# Patient Record
Sex: MALE | Race: OTHER | NOT HISPANIC OR LATINO | URBAN - METROPOLITAN AREA
[De-identification: names, ages, dates, MRNs, and addresses within clinical notes are randomized per-mention and may not be internally consistent; named-entity substitution may affect disease eponyms.]

---

## 2017-02-06 ENCOUNTER — EMERGENCY (EMERGENCY)
Facility: HOSPITAL | Age: 27
LOS: 1 days | Discharge: HOME | End: 2017-02-06
Attending: EMERGENCY MEDICINE | Admitting: EMERGENCY MEDICINE
Payer: MEDICAID

## 2017-02-06 VITALS
HEART RATE: 89 BPM | SYSTOLIC BLOOD PRESSURE: 126 MMHG | TEMPERATURE: 98 F | OXYGEN SATURATION: 100 % | DIASTOLIC BLOOD PRESSURE: 74 MMHG | RESPIRATION RATE: 18 BRPM

## 2017-02-06 VITALS
RESPIRATION RATE: 18 BRPM | OXYGEN SATURATION: 100 % | DIASTOLIC BLOOD PRESSURE: 70 MMHG | TEMPERATURE: 98 F | SYSTOLIC BLOOD PRESSURE: 125 MMHG | WEIGHT: 169.09 LBS | HEART RATE: 107 BPM

## 2017-02-06 LAB
ALBUMIN SERPL ELPH-MCNC: 4.1 G/DL — SIGNIFICANT CHANGE UP (ref 3.4–5)
ALP SERPL-CCNC: 96 U/L — SIGNIFICANT CHANGE UP (ref 40–120)
ALT FLD-CCNC: 44 U/L — HIGH (ref 12–42)
ANION GAP SERPL CALC-SCNC: 9 MMOL/L — SIGNIFICANT CHANGE UP (ref 9–16)
AST SERPL-CCNC: 46 U/L — HIGH (ref 15–37)
BASOPHILS NFR BLD AUTO: 0.5 % — SIGNIFICANT CHANGE UP (ref 0–2)
BILIRUB SERPL-MCNC: 0.4 MG/DL — SIGNIFICANT CHANGE UP (ref 0.2–1.2)
BUN SERPL-MCNC: 25 MG/DL — HIGH (ref 7–23)
CALCIUM SERPL-MCNC: 9.5 MG/DL — SIGNIFICANT CHANGE UP (ref 8.5–10.5)
CHLORIDE SERPL-SCNC: 99 MMOL/L — SIGNIFICANT CHANGE UP (ref 96–108)
CO2 SERPL-SCNC: 30 MMOL/L — SIGNIFICANT CHANGE UP (ref 22–31)
CREAT SERPL-MCNC: 1 MG/DL — SIGNIFICANT CHANGE UP (ref 0.5–1.3)
EOSINOPHIL NFR BLD AUTO: 0.9 % — SIGNIFICANT CHANGE UP (ref 0–6)
GLUCOSE SERPL-MCNC: 96 MG/DL — SIGNIFICANT CHANGE UP (ref 70–99)
HCT VFR BLD CALC: 42 % — SIGNIFICANT CHANGE UP (ref 39–50)
HGB BLD-MCNC: 14.8 G/DL — SIGNIFICANT CHANGE UP (ref 13–17)
IMM GRANULOCYTES NFR BLD AUTO: 0.3 % — SIGNIFICANT CHANGE UP (ref 0–1.5)
LYMPHOCYTES # BLD AUTO: 19.4 % — SIGNIFICANT CHANGE UP (ref 13–44)
MAGNESIUM SERPL-MCNC: 1.9 MG/DL — SIGNIFICANT CHANGE UP (ref 1.6–2.4)
MCHC RBC-ENTMCNC: 31.2 PG — SIGNIFICANT CHANGE UP (ref 27–34)
MCHC RBC-ENTMCNC: 35.2 G/DL — SIGNIFICANT CHANGE UP (ref 32–36)
MCV RBC AUTO: 88.6 FL — SIGNIFICANT CHANGE UP (ref 80–100)
MONOCYTES NFR BLD AUTO: 9.8 % — SIGNIFICANT CHANGE UP (ref 2–14)
NEUTROPHILS NFR BLD AUTO: 69.1 % — SIGNIFICANT CHANGE UP (ref 43–77)
PLATELET # BLD AUTO: 321 K/UL — SIGNIFICANT CHANGE UP (ref 150–400)
POTASSIUM SERPL-MCNC: 3.2 MMOL/L — LOW (ref 3.5–5.3)
POTASSIUM SERPL-SCNC: 3.2 MMOL/L — LOW (ref 3.5–5.3)
PROT SERPL-MCNC: 7.7 G/DL — SIGNIFICANT CHANGE UP (ref 6.4–8.2)
RBC # BLD: 4.74 M/UL — SIGNIFICANT CHANGE UP (ref 4.2–5.8)
RBC # FLD: 12.4 % — SIGNIFICANT CHANGE UP (ref 10.3–16.9)
SODIUM SERPL-SCNC: 138 MMOL/L — SIGNIFICANT CHANGE UP (ref 132–145)
WBC # BLD: 8.5 K/UL — SIGNIFICANT CHANGE UP (ref 3.8–10.5)
WBC # FLD AUTO: 8.5 K/UL — SIGNIFICANT CHANGE UP (ref 3.8–10.5)

## 2017-02-06 PROCEDURE — 99284 EMERGENCY DEPT VISIT MOD MDM: CPT

## 2017-02-06 RX ORDER — SODIUM CHLORIDE 9 MG/ML
2000 INJECTION INTRAMUSCULAR; INTRAVENOUS; SUBCUTANEOUS ONCE
Qty: 0 | Refills: 0 | Status: COMPLETED | OUTPATIENT
Start: 2017-02-06 | End: 2017-02-06

## 2017-02-06 RX ORDER — CIPROFLOXACIN LACTATE 400MG/40ML
400 VIAL (ML) INTRAVENOUS ONCE
Qty: 0 | Refills: 0 | Status: COMPLETED | OUTPATIENT
Start: 2017-02-06 | End: 2017-02-06

## 2017-02-06 RX ORDER — MOXIFLOXACIN HYDROCHLORIDE TABLETS, 400 MG 400 MG/1
1 TABLET, FILM COATED ORAL
Qty: 5 | Refills: 0 | OUTPATIENT
Start: 2017-02-06 | End: 2017-02-09

## 2017-02-06 RX ORDER — ONDANSETRON 8 MG/1
4 TABLET, FILM COATED ORAL ONCE
Qty: 0 | Refills: 0 | Status: COMPLETED | OUTPATIENT
Start: 2017-02-06 | End: 2017-02-06

## 2017-02-06 RX ORDER — POTASSIUM CHLORIDE 20 MEQ
40 PACKET (EA) ORAL ONCE
Qty: 0 | Refills: 0 | Status: COMPLETED | OUTPATIENT
Start: 2017-02-06 | End: 2017-02-06

## 2017-02-06 RX ADMIN — SODIUM CHLORIDE 1000 MILLILITER(S): 9 INJECTION INTRAMUSCULAR; INTRAVENOUS; SUBCUTANEOUS at 13:47

## 2017-02-06 RX ADMIN — ONDANSETRON 4 MILLIGRAM(S): 8 TABLET, FILM COATED ORAL at 13:47

## 2017-02-06 RX ADMIN — Medication 200 MILLIGRAM(S): at 13:47

## 2017-02-06 NOTE — ED PROVIDER NOTE - OBJECTIVE STATEMENT
26 yom pw nvd x 5 days, periumbilical abd pain unchanged in location or intensity, no modifying factors, no sick contact, no prior abd surgeries.  no fever.  feels sx improving.  nonbloody diarrhea and emesis.  no travel.

## 2017-02-06 NOTE — ED PROVIDER NOTE - CARE PLAN
Principal Discharge DX:	Diarrhea  Secondary Diagnosis:	Nausea & vomiting Principal Discharge DX:	Diarrhea  Secondary Diagnosis:	Nausea & vomiting  Secondary Diagnosis:	Hypokalemia

## 2017-02-06 NOTE — ED PROVIDER NOTE - MEDICAL DECISION MAKING DETAILS
5 days of nvd, abd pain but soft nontender abd on exam, no rlq tenderness, low suspicion for acute appendicitis, will give cipro given duration of ongoing sx, and fluids and antiemetics

## 2017-02-06 NOTE — ED PROVIDER NOTE - PMH
Contusion of knee, unspecified laterality, subsequent encounter    Drug abuse    Phlebitis after injection or infusion

## 2017-02-10 DIAGNOSIS — R10.33 PERIUMBILICAL PAIN: ICD-10-CM

## 2017-02-10 DIAGNOSIS — Z79.899 OTHER LONG TERM (CURRENT) DRUG THERAPY: ICD-10-CM

## 2017-02-10 DIAGNOSIS — E87.6 HYPOKALEMIA: ICD-10-CM

## 2017-02-10 DIAGNOSIS — Z79.2 LONG TERM (CURRENT) USE OF ANTIBIOTICS: ICD-10-CM

## 2017-02-10 DIAGNOSIS — F17.200 NICOTINE DEPENDENCE, UNSPECIFIED, UNCOMPLICATED: ICD-10-CM

## 2017-02-11 ENCOUNTER — EMERGENCY (EMERGENCY)
Facility: HOSPITAL | Age: 27
LOS: 1 days | Discharge: HOME | End: 2017-02-11
Attending: EMERGENCY MEDICINE | Admitting: EMERGENCY MEDICINE
Payer: MEDICAID

## 2017-02-11 VITALS
TEMPERATURE: 99 F | HEART RATE: 88 BPM | SYSTOLIC BLOOD PRESSURE: 130 MMHG | RESPIRATION RATE: 16 BRPM | DIASTOLIC BLOOD PRESSURE: 70 MMHG | OXYGEN SATURATION: 100 %

## 2017-02-11 VITALS
RESPIRATION RATE: 17 BRPM | TEMPERATURE: 98 F | HEART RATE: 98 BPM | OXYGEN SATURATION: 97 % | DIASTOLIC BLOOD PRESSURE: 75 MMHG | SYSTOLIC BLOOD PRESSURE: 136 MMHG

## 2017-02-11 LAB
ALBUMIN SERPL ELPH-MCNC: 3.4 G/DL — SIGNIFICANT CHANGE UP (ref 3.4–5)
ALP SERPL-CCNC: 71 U/L — SIGNIFICANT CHANGE UP (ref 40–120)
ALT FLD-CCNC: 28 U/L — SIGNIFICANT CHANGE UP (ref 12–42)
ANION GAP SERPL CALC-SCNC: 8 MMOL/L — LOW (ref 9–16)
AST SERPL-CCNC: 22 U/L — SIGNIFICANT CHANGE UP (ref 15–37)
BILIRUB SERPL-MCNC: 0.2 MG/DL — SIGNIFICANT CHANGE UP (ref 0.2–1.2)
BUN SERPL-MCNC: 13 MG/DL — SIGNIFICANT CHANGE UP (ref 7–23)
CALCIUM SERPL-MCNC: 8.6 MG/DL — SIGNIFICANT CHANGE UP (ref 8.5–10.5)
CHLORIDE SERPL-SCNC: 101 MMOL/L — SIGNIFICANT CHANGE UP (ref 96–108)
CO2 SERPL-SCNC: 30 MMOL/L — SIGNIFICANT CHANGE UP (ref 22–31)
CREAT SERPL-MCNC: 0.96 MG/DL — SIGNIFICANT CHANGE UP (ref 0.5–1.3)
ETHANOL SERPL-MCNC: <3 MG/DL — SIGNIFICANT CHANGE UP
GLUCOSE SERPL-MCNC: 83 MG/DL — SIGNIFICANT CHANGE UP (ref 70–99)
HCT VFR BLD CALC: 35.3 % — LOW (ref 39–50)
HGB BLD-MCNC: 12 G/DL — LOW (ref 13–17)
LIDOCAIN IGE QN: 331 U/L — SIGNIFICANT CHANGE UP (ref 73–393)
LYMPHOCYTES # BLD AUTO: 13 % — SIGNIFICANT CHANGE UP (ref 13–44)
MCHC RBC-ENTMCNC: 30.6 PG — SIGNIFICANT CHANGE UP (ref 27–34)
MCHC RBC-ENTMCNC: 34 G/DL — SIGNIFICANT CHANGE UP (ref 32–36)
MCV RBC AUTO: 90.1 FL — SIGNIFICANT CHANGE UP (ref 80–100)
MONOCYTES NFR BLD AUTO: 10 % — SIGNIFICANT CHANGE UP (ref 2–14)
NEUTROPHILS NFR BLD AUTO: 69 % — SIGNIFICANT CHANGE UP (ref 43–77)
PLATELET # BLD AUTO: 248 K/UL — SIGNIFICANT CHANGE UP (ref 150–400)
POTASSIUM SERPL-MCNC: 3.1 MMOL/L — LOW (ref 3.5–5.3)
POTASSIUM SERPL-SCNC: 3.1 MMOL/L — LOW (ref 3.5–5.3)
PROT SERPL-MCNC: 6.3 G/DL — LOW (ref 6.4–8.2)
RBC # BLD: 3.92 M/UL — LOW (ref 4.2–5.8)
RBC # FLD: 12.6 % — SIGNIFICANT CHANGE UP (ref 10.3–16.9)
SODIUM SERPL-SCNC: 139 MMOL/L — SIGNIFICANT CHANGE UP (ref 132–145)
WBC # BLD: 7.2 K/UL — SIGNIFICANT CHANGE UP (ref 3.8–10.5)
WBC # FLD AUTO: 7.2 K/UL — SIGNIFICANT CHANGE UP (ref 3.8–10.5)

## 2017-02-11 PROCEDURE — 99284 EMERGENCY DEPT VISIT MOD MDM: CPT

## 2017-02-11 RX ORDER — SACCHAROMYCES BOULARDII 250 MG
1 POWDER IN PACKET (EA) ORAL
Qty: 28 | Refills: 0 | OUTPATIENT
Start: 2017-02-11 | End: 2017-02-25

## 2017-02-11 RX ORDER — ACETAMINOPHEN 500 MG
975 TABLET ORAL ONCE
Qty: 0 | Refills: 0 | Status: COMPLETED | OUTPATIENT
Start: 2017-02-11 | End: 2017-02-11

## 2017-02-11 RX ORDER — FAMOTIDINE 10 MG/ML
20 INJECTION INTRAVENOUS ONCE
Qty: 0 | Refills: 0 | Status: COMPLETED | OUTPATIENT
Start: 2017-02-11 | End: 2017-02-11

## 2017-02-11 RX ORDER — POTASSIUM CHLORIDE 20 MEQ
40 PACKET (EA) ORAL ONCE
Qty: 0 | Refills: 0 | Status: COMPLETED | OUTPATIENT
Start: 2017-02-11 | End: 2017-02-11

## 2017-02-11 RX ORDER — SODIUM CHLORIDE 9 MG/ML
1000 INJECTION INTRAMUSCULAR; INTRAVENOUS; SUBCUTANEOUS ONCE
Qty: 0 | Refills: 0 | Status: COMPLETED | OUTPATIENT
Start: 2017-02-11 | End: 2017-02-11

## 2017-02-11 RX ORDER — SODIUM CHLORIDE 9 MG/ML
3 INJECTION INTRAMUSCULAR; INTRAVENOUS; SUBCUTANEOUS ONCE
Qty: 0 | Refills: 0 | Status: COMPLETED | OUTPATIENT
Start: 2017-02-11 | End: 2017-02-11

## 2017-02-11 RX ADMIN — SODIUM CHLORIDE 3 MILLILITER(S): 9 INJECTION INTRAMUSCULAR; INTRAVENOUS; SUBCUTANEOUS at 12:30

## 2017-02-11 RX ADMIN — Medication 975 MILLIGRAM(S): at 12:38

## 2017-02-11 RX ADMIN — Medication 40 MILLIEQUIVALENT(S): at 14:33

## 2017-02-11 RX ADMIN — SODIUM CHLORIDE 1000 MILLILITER(S): 9 INJECTION INTRAMUSCULAR; INTRAVENOUS; SUBCUTANEOUS at 12:38

## 2017-02-11 RX ADMIN — FAMOTIDINE 20 MILLIGRAM(S): 10 INJECTION INTRAVENOUS at 12:38

## 2017-02-11 RX ADMIN — Medication 5 MILLILITER(S): at 12:56

## 2017-02-11 NOTE — ED PROVIDER NOTE - PROGRESS NOTE DETAILS
Sitting up in bed watching TV.  Feels better and wants to be discharged.  Inc in reactive lymphocytes, potassium low again.  Oral replacement ordered.  D/w patient importance of dietary intake of potassium.  Dr. Alvarado had sent over prescriptions to pharmacy after last visit.  Reinforced this to the patient.  Conservative management discussed with the patient in detail.  Close PMD follow up encouraged.  Strict ED return instructions discussed in detail and patient given the opportunity to ask any questions about their discharge diagnosis and instructions

## 2017-02-11 NOTE — ED PROVIDER NOTE - OBJECTIVE STATEMENT
27 yo male returns to the ED with diarrhea.  Constant x 10 days, mild, watery and loose.  Denies fever or vomiting.  Persistent abdominal cramping.  Seen here a few days ago.  Labs drawn and fluids given.  Did not fill his medication (cipro).  Denies abdominal surgery.  Denies toxic habits.  NO PMH or Past psychiatric history.

## 2017-02-15 DIAGNOSIS — R19.7 DIARRHEA, UNSPECIFIED: ICD-10-CM

## 2017-02-15 DIAGNOSIS — Z79.899 OTHER LONG TERM (CURRENT) DRUG THERAPY: ICD-10-CM

## 2017-02-15 DIAGNOSIS — Z79.2 LONG TERM (CURRENT) USE OF ANTIBIOTICS: ICD-10-CM

## 2017-02-15 DIAGNOSIS — R10.9 UNSPECIFIED ABDOMINAL PAIN: ICD-10-CM

## 2017-02-15 DIAGNOSIS — F17.200 NICOTINE DEPENDENCE, UNSPECIFIED, UNCOMPLICATED: ICD-10-CM

## 2017-03-05 ENCOUNTER — EMERGENCY (EMERGENCY)
Facility: HOSPITAL | Age: 27
LOS: 1 days | Discharge: HOME | End: 2017-03-05
Attending: EMERGENCY MEDICINE | Admitting: EMERGENCY MEDICINE
Payer: MEDICAID

## 2017-03-05 VITALS
OXYGEN SATURATION: 98 % | HEART RATE: 80 BPM | SYSTOLIC BLOOD PRESSURE: 132 MMHG | RESPIRATION RATE: 16 BRPM | DIASTOLIC BLOOD PRESSURE: 72 MMHG | TEMPERATURE: 98 F

## 2017-03-05 DIAGNOSIS — I80.9 PHLEBITIS AND THROMBOPHLEBITIS OF UNSPECIFIED SITE: ICD-10-CM

## 2017-03-05 DIAGNOSIS — F17.210 NICOTINE DEPENDENCE, CIGARETTES, UNCOMPLICATED: ICD-10-CM

## 2017-03-05 DIAGNOSIS — L03.113 CELLULITIS OF RIGHT UPPER LIMB: ICD-10-CM

## 2017-03-05 DIAGNOSIS — Z79.2 LONG TERM (CURRENT) USE OF ANTIBIOTICS: ICD-10-CM

## 2017-03-05 DIAGNOSIS — Z79.899 OTHER LONG TERM (CURRENT) DRUG THERAPY: ICD-10-CM

## 2017-03-05 DIAGNOSIS — M79.601 PAIN IN RIGHT ARM: ICD-10-CM

## 2017-03-05 PROCEDURE — 73070 X-RAY EXAM OF ELBOW: CPT | Mod: 26,RT

## 2017-03-05 PROCEDURE — 99283 EMERGENCY DEPT VISIT LOW MDM: CPT

## 2017-03-05 PROCEDURE — 99284 EMERGENCY DEPT VISIT MOD MDM: CPT | Mod: 25

## 2017-03-05 PROCEDURE — 99053 MED SERV 10PM-8AM 24 HR FAC: CPT

## 2017-03-05 RX ORDER — BACITRACIN ZINC 500 UNIT/G
1 OINTMENT IN PACKET (EA) TOPICAL ONCE
Qty: 0 | Refills: 0 | Status: COMPLETED | OUTPATIENT
Start: 2017-03-05 | End: 2017-03-05

## 2017-03-05 RX ORDER — MUPIROCIN 20 MG/G
1 OINTMENT TOPICAL
Qty: 1 | Refills: 0
Start: 2017-03-05 | End: 2017-03-12

## 2017-03-05 RX ADMIN — Medication 1 APPLICATION(S): at 16:21

## 2017-03-05 RX ADMIN — Medication 1 TABLET(S): at 16:21

## 2017-03-05 NOTE — ED PROVIDER NOTE - OBJECTIVE STATEMENT
27 y/o M with h/o IVDA presents with erythematous lesions to b/l arms secondary to IV drug use. +2 open wounds noted to R upper arm with surrounding erythema. No f/c, drainage. Pt incidentally also c/o ear pain.

## 2017-03-05 NOTE — ED PROVIDER NOTE - CARE PLAN
Principal Discharge DX:	Cellulitis of arm, right  Secondary Diagnosis:	Drug abuse  Secondary Diagnosis:	Phlebitis after injection or infusion

## 2017-03-05 NOTE — ED PROVIDER NOTE - NS ED MD SCRIBE ATTENDING SCRIBE SECTIONS
PHYSICAL EXAM/HIV/PAST MEDICAL/SURGICAL/SOCIAL HISTORY/HISTORY OF PRESENT ILLNESS/DISPOSITION/VITAL SIGNS( Pullset)/REVIEW OF SYSTEMS

## 2017-03-06 VITALS
RESPIRATION RATE: 18 BRPM | DIASTOLIC BLOOD PRESSURE: 69 MMHG | HEART RATE: 96 BPM | TEMPERATURE: 98 F | SYSTOLIC BLOOD PRESSURE: 117 MMHG | OXYGEN SATURATION: 98 %

## 2017-03-06 RX ORDER — AZTREONAM 2 G
1 VIAL (EA) INJECTION
Qty: 20 | Refills: 0 | OUTPATIENT
Start: 2017-03-06 | End: 2017-03-16

## 2017-03-06 RX ORDER — MELOXICAM 15 MG/1
1 TABLET ORAL
Qty: 10 | Refills: 0 | OUTPATIENT
Start: 2017-03-06 | End: 2017-03-11

## 2017-03-06 RX ORDER — KETOROLAC TROMETHAMINE 30 MG/ML
60 SYRINGE (ML) INJECTION ONCE
Qty: 0 | Refills: 0 | Status: DISCONTINUED | OUTPATIENT
Start: 2017-03-06 | End: 2017-03-06

## 2017-03-06 RX ADMIN — Medication 60 MILLIGRAM(S): at 02:03

## 2017-03-06 RX ADMIN — Medication 2 TABLET(S): at 01:37

## 2017-03-06 NOTE — ED ADULT NURSE NOTE - CHIEF COMPLAINT QUOTE
Pt. returning to the ED after being treated and released today for cellulitis of the right arm. Pt. reports "I didn't have time to  my prescriptions and my arm hurts".

## 2017-03-06 NOTE — ED PROVIDER NOTE - OBJECTIVE STATEMENT
25 yo male seen here yesterday pm for arm cellulitis returns stating he did not get hos rx filled and requesting 2nd abx dose and c/o right arm pain. denies f/c/syncope or spreading of cellulitis.

## 2017-03-13 ENCOUNTER — EMERGENCY (EMERGENCY)
Facility: HOSPITAL | Age: 27
LOS: 1 days | Discharge: HOME | End: 2017-03-13
Attending: EMERGENCY MEDICINE | Admitting: EMERGENCY MEDICINE
Payer: MEDICAID

## 2017-03-13 VITALS
TEMPERATURE: 98 F | RESPIRATION RATE: 20 BRPM | OXYGEN SATURATION: 98 % | SYSTOLIC BLOOD PRESSURE: 132 MMHG | HEIGHT: 73 IN | HEART RATE: 76 BPM | DIASTOLIC BLOOD PRESSURE: 74 MMHG | WEIGHT: 164.91 LBS

## 2017-03-13 DIAGNOSIS — S41.131A PUNCTURE WOUND WITHOUT FOREIGN BODY OF RIGHT UPPER ARM, INITIAL ENCOUNTER: ICD-10-CM

## 2017-03-13 DIAGNOSIS — Z79.899 OTHER LONG TERM (CURRENT) DRUG THERAPY: ICD-10-CM

## 2017-03-13 DIAGNOSIS — Z79.2 LONG TERM (CURRENT) USE OF ANTIBIOTICS: ICD-10-CM

## 2017-03-13 DIAGNOSIS — M79.601 PAIN IN RIGHT ARM: ICD-10-CM

## 2017-03-13 DIAGNOSIS — Z79.1 LONG TERM (CURRENT) USE OF NON-STEROIDAL ANTI-INFLAMMATORIES (NSAID): ICD-10-CM

## 2017-03-13 DIAGNOSIS — F17.200 NICOTINE DEPENDENCE, UNSPECIFIED, UNCOMPLICATED: ICD-10-CM

## 2017-03-13 DIAGNOSIS — F19.10 OTHER PSYCHOACTIVE SUBSTANCE ABUSE, UNCOMPLICATED: ICD-10-CM

## 2017-03-13 PROCEDURE — 99283 EMERGENCY DEPT VISIT LOW MDM: CPT | Mod: 25

## 2017-03-13 PROCEDURE — 99053 MED SERV 10PM-8AM 24 HR FAC: CPT

## 2017-03-13 RX ORDER — AZTREONAM 2 G
1 VIAL (EA) INJECTION
Qty: 14 | Refills: 0
Start: 2017-03-13 | End: 2017-03-20

## 2017-03-13 RX ADMIN — Medication 1 TABLET(S): at 23:29

## 2017-03-13 NOTE — ED ADULT TRIAGE NOTE - CHIEF COMPLAINT QUOTE
Received patient as walkin with complaints of wound check to right antecubital area. Patient reports he uses heroin and has had this wound for about 2-3 weeks, open wound noted, no bleeding, no swelling, no purulent drainage noted. patient also wants to lay in a stretcher to sleep, demanding food and soda now.

## 2017-03-15 NOTE — ED PROVIDER NOTE - CARE PLAN
Principal Discharge DX:	Puncture wound of arm, right, multiple sites  Secondary Diagnosis:	Drug abuse

## 2017-03-15 NOTE — ED PROVIDER NOTE - OBJECTIVE STATEMENT
Patient with hx of IVDA, otherwise unknown pmhx, arrives with pain to R arm. Notes using the R anticubital region for injection of heroine, now presents with pain and redness. notes the injection site has been worsening for several weeks. arrives with guaze wrapped around arm. denies recent trauma otherwise, falls or numbness, paresthesias. denies cp, sob.

## 2017-03-15 NOTE — ED PROVIDER NOTE - UPPER EXTREMITY EXAM, RIGHT
TENDERNESS/2 1 cm round punture wound injection sites over anticubital region with underlying ttp, and mild erythema. borders of wounds have whitish fibrotic tissue, granulated, and consistent with chronic wound. no fluctuance, induration or drainage. no crepitus or proximal streaking.

## 2017-03-15 NOTE — ED PROVIDER NOTE - MEDICAL DECISION MAKING DETAILS
started on bactrim. patient advised to refrain from using arm for IVDA. not using clean needles, and given risk factor which could

## 2017-03-15 NOTE — ED PROVIDER NOTE - SKIN AREA #1
volar/2 1 cm round punture wound injection sites over anticubital region with underlying ttp, and mild erythema. borders of wounds have whitish fibrotic tissue, granulated, and consistent with chronic wound. no fluctuance, induration or drainage. no crepitus or proximal streaking.

## 2017-03-16 ENCOUNTER — EMERGENCY (EMERGENCY)
Facility: HOSPITAL | Age: 27
LOS: 1 days | Discharge: LEFT BEFORE DISCHARGE | End: 2017-03-16
Admitting: EMERGENCY MEDICINE

## 2017-03-16 VITALS
HEART RATE: 65 BPM | DIASTOLIC BLOOD PRESSURE: 97 MMHG | SYSTOLIC BLOOD PRESSURE: 153 MMHG | WEIGHT: 160.06 LBS | TEMPERATURE: 98 F | HEIGHT: 74 IN | OXYGEN SATURATION: 100 % | RESPIRATION RATE: 18 BRPM

## 2017-03-16 DIAGNOSIS — Z48.01 ENCOUNTER FOR CHANGE OR REMOVAL OF SURGICAL WOUND DRESSING: ICD-10-CM

## 2017-03-20 ENCOUNTER — EMERGENCY (EMERGENCY)
Facility: HOSPITAL | Age: 27
LOS: 1 days | Discharge: HOME | End: 2017-03-20
Attending: EMERGENCY MEDICINE | Admitting: EMERGENCY MEDICINE

## 2017-03-20 VITALS
RESPIRATION RATE: 16 BRPM | DIASTOLIC BLOOD PRESSURE: 82 MMHG | OXYGEN SATURATION: 100 % | TEMPERATURE: 97 F | SYSTOLIC BLOOD PRESSURE: 136 MMHG | HEART RATE: 106 BPM

## 2017-03-20 DIAGNOSIS — S01.91XA LACERATION WITHOUT FOREIGN BODY OF UNSPECIFIED PART OF HEAD, INITIAL ENCOUNTER: ICD-10-CM
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
## 2017-03-20 NOTE — ED PROVIDER NOTE - NS ED MD SCRIBE ATTENDING SCRIBE SECTIONS
VITAL SIGNS( Pullset)/REVIEW OF SYSTEMS/RESULTS/DISPOSITION/PAST MEDICAL/SURGICAL/SOCIAL HISTORY/PHYSICAL EXAM/HIV/HISTORY OF PRESENT ILLNESS/PROGRESS NOTE

## 2017-03-21 ENCOUNTER — EMERGENCY (EMERGENCY)
Facility: HOSPITAL | Age: 27
LOS: 1 days | Discharge: LEFT BEFORE DISCHARGE | End: 2017-03-21
Attending: EMERGENCY MEDICINE | Admitting: EMERGENCY MEDICINE

## 2017-03-21 VITALS
HEART RATE: 98 BPM | OXYGEN SATURATION: 97 % | RESPIRATION RATE: 18 BRPM | TEMPERATURE: 98 F | DIASTOLIC BLOOD PRESSURE: 88 MMHG | SYSTOLIC BLOOD PRESSURE: 145 MMHG

## 2017-03-21 DIAGNOSIS — S31.010A LACERATION WITHOUT FOREIGN BODY OF LOWER BACK AND PELVIS WITHOUT PENETRATION INTO RETROPERITONEUM, INITIAL ENCOUNTER: ICD-10-CM
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
## 2017-03-29 ENCOUNTER — EMERGENCY (EMERGENCY)
Facility: HOSPITAL | Age: 27
LOS: 1 days | Discharge: HOME | End: 2017-03-29
Attending: EMERGENCY MEDICINE | Admitting: EMERGENCY MEDICINE
Payer: MEDICAID

## 2017-03-29 VITALS
TEMPERATURE: 98 F | RESPIRATION RATE: 18 BRPM | SYSTOLIC BLOOD PRESSURE: 124 MMHG | DIASTOLIC BLOOD PRESSURE: 85 MMHG | HEART RATE: 112 BPM | OXYGEN SATURATION: 97 %

## 2017-03-29 VITALS
SYSTOLIC BLOOD PRESSURE: 123 MMHG | RESPIRATION RATE: 19 BRPM | HEART RATE: 118 BPM | OXYGEN SATURATION: 96 % | TEMPERATURE: 99 F | DIASTOLIC BLOOD PRESSURE: 85 MMHG

## 2017-03-29 DIAGNOSIS — Z79.899 OTHER LONG TERM (CURRENT) DRUG THERAPY: ICD-10-CM

## 2017-03-29 DIAGNOSIS — F17.200 NICOTINE DEPENDENCE, UNSPECIFIED, UNCOMPLICATED: ICD-10-CM

## 2017-03-29 DIAGNOSIS — R11.0 NAUSEA: ICD-10-CM

## 2017-03-29 DIAGNOSIS — R45.1 RESTLESSNESS AND AGITATION: ICD-10-CM

## 2017-03-29 DIAGNOSIS — R46.0 VERY LOW LEVEL OF PERSONAL HYGIENE: ICD-10-CM

## 2017-03-29 DIAGNOSIS — R19.7 DIARRHEA, UNSPECIFIED: ICD-10-CM

## 2017-03-29 PROCEDURE — 99283 EMERGENCY DEPT VISIT LOW MDM: CPT
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
## 2017-04-17 ENCOUNTER — EMERGENCY (EMERGENCY)
Facility: HOSPITAL | Age: 27
LOS: 1 days | Discharge: HOME | End: 2017-04-17
Attending: EMERGENCY MEDICINE | Admitting: EMERGENCY MEDICINE

## 2017-04-17 VITALS
DIASTOLIC BLOOD PRESSURE: 68 MMHG | HEART RATE: 87 BPM | TEMPERATURE: 98 F | RESPIRATION RATE: 17 BRPM | OXYGEN SATURATION: 99 % | SYSTOLIC BLOOD PRESSURE: 119 MMHG

## 2017-04-17 DIAGNOSIS — H57.11 OCULAR PAIN, RIGHT EYE: ICD-10-CM

## 2017-06-10 ENCOUNTER — EMERGENCY (EMERGENCY)
Facility: HOSPITAL | Age: 27
LOS: 1 days | Discharge: HOME | End: 2017-06-10
Attending: EMERGENCY MEDICINE | Admitting: EMERGENCY MEDICINE
Payer: MEDICAID

## 2017-06-10 VITALS
OXYGEN SATURATION: 99 % | RESPIRATION RATE: 16 BRPM | HEART RATE: 100 BPM | WEIGHT: 164.91 LBS | SYSTOLIC BLOOD PRESSURE: 118 MMHG | HEIGHT: 73 IN | DIASTOLIC BLOOD PRESSURE: 74 MMHG | TEMPERATURE: 98 F

## 2017-06-10 DIAGNOSIS — F17.200 NICOTINE DEPENDENCE, UNSPECIFIED, UNCOMPLICATED: ICD-10-CM

## 2017-06-10 DIAGNOSIS — Z79.2 LONG TERM (CURRENT) USE OF ANTIBIOTICS: ICD-10-CM

## 2017-06-10 DIAGNOSIS — H61.001 UNSPECIFIED PERICHONDRITIS OF RIGHT EXTERNAL EAR: ICD-10-CM

## 2017-06-10 DIAGNOSIS — Z79.899 OTHER LONG TERM (CURRENT) DRUG THERAPY: ICD-10-CM

## 2017-06-10 DIAGNOSIS — H92.01 OTALGIA, RIGHT EAR: ICD-10-CM

## 2017-06-10 PROCEDURE — 99284 EMERGENCY DEPT VISIT MOD MDM: CPT | Mod: 25

## 2017-06-10 NOTE — ED PROVIDER NOTE - OBJECTIVE STATEMENT
27 yo M with PMHx of substance abuse, presenting c/o R ear pain x 1 month.  Pt reports R ear pain x 1 month with progressive worsening swelling and redness to the external ear and earlobe region. ?remote trauma.  Denies tinnitus, change in hearing/gait/balance, dysequilibrium, HA, dizziness, fever, chills, FB sensation, cough, sore throat, d/c, bleeding, pruritus, N/V, SOB, CP, palpitations, focal weakness, and malaise.

## 2017-06-10 NOTE — ED PROVIDER NOTE - ENMT, MLM
Airway patent, Nasal mucosa clear. Mouth with normal mucosa. Throat has no vesicles, no oropharyngeal exudates and uvula is midline. TM intact b/l with good cone of light, no d/c or bleeding, no mastoid tenderness b/l, +cauliflower ear to the R pinna/tragus with mild surrounding erythema and edema, no fluctuance or d/c currently

## 2017-06-11 RX ORDER — KETOROLAC TROMETHAMINE 30 MG/ML
60 SYRINGE (ML) INJECTION ONCE
Qty: 0 | Refills: 0 | Status: DISCONTINUED | OUTPATIENT
Start: 2017-06-11 | End: 2017-06-11

## 2017-06-11 RX ORDER — IBUPROFEN 200 MG
1 TABLET ORAL
Qty: 15 | Refills: 0 | OUTPATIENT
Start: 2017-06-11 | End: 2017-06-14

## 2017-06-11 RX ORDER — CIPROFLOXACIN LACTATE 400MG/40ML
500 VIAL (ML) INTRAVENOUS ONCE
Qty: 0 | Refills: 0 | Status: COMPLETED | OUTPATIENT
Start: 2017-06-11 | End: 2017-06-11

## 2017-06-11 RX ORDER — MOXIFLOXACIN HYDROCHLORIDE TABLETS, 400 MG 400 MG/1
1 TABLET, FILM COATED ORAL
Qty: 14 | Refills: 0 | OUTPATIENT
Start: 2017-06-11 | End: 2017-06-18

## 2017-06-11 RX ORDER — AZTREONAM 2 G
1 VIAL (EA) INJECTION
Qty: 14 | Refills: 0 | OUTPATIENT
Start: 2017-06-11 | End: 2017-06-18

## 2017-06-11 RX ADMIN — Medication 60 MILLIGRAM(S): at 00:20

## 2017-06-11 RX ADMIN — Medication 1 TABLET(S): at 00:20

## 2017-06-11 RX ADMIN — Medication 500 MILLIGRAM(S): at 00:20

## 2017-07-22 ENCOUNTER — EMERGENCY (EMERGENCY)
Facility: HOSPITAL | Age: 27
LOS: 1 days | Discharge: LEFT BEFORE DISCHARGE | End: 2017-07-22
Attending: EMERGENCY MEDICINE | Admitting: EMERGENCY MEDICINE

## 2017-07-22 VITALS
DIASTOLIC BLOOD PRESSURE: 89 MMHG | RESPIRATION RATE: 18 BRPM | OXYGEN SATURATION: 99 % | TEMPERATURE: 98 F | HEART RATE: 100 BPM | SYSTOLIC BLOOD PRESSURE: 125 MMHG

## 2017-07-22 DIAGNOSIS — Z03.89 ENCOUNTER FOR OBSERVATION FOR OTHER SUSPECTED DISEASES AND CONDITIONS RULED OUT: ICD-10-CM
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
## 2017-08-10 ENCOUNTER — EMERGENCY (EMERGENCY)
Facility: HOSPITAL | Age: 27
LOS: 1 days | Discharge: HOME | End: 2017-08-10
Admitting: EMERGENCY MEDICINE
Payer: MEDICAID

## 2017-08-10 VITALS
SYSTOLIC BLOOD PRESSURE: 133 MMHG | HEART RATE: 111 BPM | TEMPERATURE: 98 F | RESPIRATION RATE: 16 BRPM | DIASTOLIC BLOOD PRESSURE: 85 MMHG | OXYGEN SATURATION: 100 %

## 2017-08-10 DIAGNOSIS — F17.200 NICOTINE DEPENDENCE, UNSPECIFIED, UNCOMPLICATED: ICD-10-CM

## 2017-08-10 DIAGNOSIS — K02.9 DENTAL CARIES, UNSPECIFIED: ICD-10-CM

## 2017-08-10 DIAGNOSIS — H92.03 OTALGIA, BILATERAL: ICD-10-CM

## 2017-08-10 DIAGNOSIS — H66.93 OTITIS MEDIA, UNSPECIFIED, BILATERAL: ICD-10-CM

## 2017-08-10 DIAGNOSIS — H72.91 UNSPECIFIED PERFORATION OF TYMPANIC MEMBRANE, RIGHT EAR: ICD-10-CM

## 2017-08-10 DIAGNOSIS — Z79.2 LONG TERM (CURRENT) USE OF ANTIBIOTICS: ICD-10-CM

## 2017-08-10 DIAGNOSIS — Z79.899 OTHER LONG TERM (CURRENT) DRUG THERAPY: ICD-10-CM

## 2017-08-10 DIAGNOSIS — Z79.1 LONG TERM (CURRENT) USE OF NON-STEROIDAL ANTI-INFLAMMATORIES (NSAID): ICD-10-CM

## 2017-08-10 PROCEDURE — 99283 EMERGENCY DEPT VISIT LOW MDM: CPT

## 2017-08-10 RX ORDER — IBUPROFEN 200 MG
600 TABLET ORAL ONCE
Qty: 0 | Refills: 0 | Status: COMPLETED | OUTPATIENT
Start: 2017-08-10 | End: 2017-08-10

## 2017-08-10 RX ORDER — IBUPROFEN 200 MG
1 TABLET ORAL
Qty: 20 | Refills: 0 | OUTPATIENT
Start: 2017-08-10 | End: 2017-08-15

## 2017-08-10 RX ORDER — CIPROFLOXACIN AND DEXAMETHASONE 3; 1 MG/ML; MG/ML
4 SUSPENSION/ DROPS AURICULAR (OTIC) ONCE
Qty: 0 | Refills: 0 | Status: COMPLETED | OUTPATIENT
Start: 2017-08-10 | End: 2017-08-10

## 2017-08-10 RX ADMIN — CIPROFLOXACIN AND DEXAMETHASONE 4 DROP(S): 3; 1 SUSPENSION/ DROPS AURICULAR (OTIC) at 13:11

## 2017-08-10 RX ADMIN — Medication 1 TABLET(S): at 13:11

## 2017-08-10 RX ADMIN — Medication 600 MILLIGRAM(S): at 13:12

## 2017-08-10 NOTE — ED PROVIDER NOTE - TOOTH FINDINGS
DENTAL CARIES/dental caries on surface of L upper molar where pt localizes pain, no gingival erythema or swelling or drainage.  + tenderness/ sensitivity to tooth

## 2017-08-10 NOTE — ED ADULT TRIAGE NOTE - CHIEF COMPLAINT QUOTE
Pt with complaints of left sided tooth pain and bilateral ear pain. Pt eating nuts out in triage and sts" I'm leaving in a half hour to go home and sleep."

## 2017-08-10 NOTE — ED PROVIDER NOTE - OBJECTIVE STATEMENT
27 y/o M presents to ED c/o bilat ear pain and L upper molar pain x 1 day.  He denies fevers/chills, n/v, recent trauma to ear or face, drainage from ear or tooth.  Of note, pt states he had trauma to R ear in the past with permanent swelling to R external ear.

## 2017-08-10 NOTE — ED PROVIDER NOTE - MEDICAL DECISION MAKING DETAILS
27 y/o M with PMH of drug abuse presents to ED c/o bilat ear pain and L upper toothache.  +AOM bilat with ruptured TM on right.  Will treat with oral abx and ear drops.  Pt advised to f/u with PCP.  Return precautions discussed. 25 y/o M with PMH of drug abuse presents to ED c/o bilat ear pain and L upper toothache.  +AOM bilat with ruptured TM on right.  Will treat with oral abx and ear drops.  Pt advised to f/u with ENT at [facility] and [facility].  Return precautions discussed.

## 2017-08-10 NOTE — ED PROVIDER NOTE - CARE PLAN
Principal Discharge DX:	Acute otitis media  Secondary Diagnosis:	Ruptured tympanic membrane, right  Secondary Diagnosis:	Dental caries of smooth surface

## 2017-08-10 NOTE — ED PROVIDER NOTE - RIGHT EAR
R external ear is swollen and slightly deformed, cauliflower ear.  Pt states he had history of trauma to ear in the remote past./EXTERNAL CANAL INFLAMMATION/TM PERFORATIONS

## 2017-08-29 ENCOUNTER — EMERGENCY (EMERGENCY)
Facility: HOSPITAL | Age: 27
LOS: 1 days | Discharge: HOME | End: 2017-08-29
Admitting: EMERGENCY MEDICINE
Payer: MEDICAID

## 2017-08-29 VITALS
OXYGEN SATURATION: 98 % | TEMPERATURE: 98 F | SYSTOLIC BLOOD PRESSURE: 126 MMHG | RESPIRATION RATE: 20 BRPM | HEART RATE: 105 BPM | DIASTOLIC BLOOD PRESSURE: 75 MMHG

## 2017-08-29 VITALS
HEART RATE: 98 BPM | SYSTOLIC BLOOD PRESSURE: 121 MMHG | RESPIRATION RATE: 18 BRPM | DIASTOLIC BLOOD PRESSURE: 80 MMHG | OXYGEN SATURATION: 98 %

## 2017-08-29 DIAGNOSIS — H92.03 OTALGIA, BILATERAL: ICD-10-CM

## 2017-08-29 DIAGNOSIS — H60.11 CELLULITIS OF RIGHT EXTERNAL EAR: ICD-10-CM

## 2017-08-29 PROCEDURE — 99284 EMERGENCY DEPT VISIT MOD MDM: CPT | Mod: 25

## 2017-08-29 RX ORDER — IBUPROFEN 200 MG
1 TABLET ORAL
Qty: 28 | Refills: 0 | OUTPATIENT
Start: 2017-08-29 | End: 2017-09-05

## 2017-08-29 RX ADMIN — Medication 1 TABLET(S): at 08:37

## 2017-08-29 NOTE — ED PROVIDER NOTE - MEDICAL DECISION MAKING DETAILS
will treat for right ear cellulitis, probable chronic, rx augmentin, nsaids, advised to follow up with ENT

## 2017-08-29 NOTE — ED ADULT NURSE NOTE - OBJECTIVE STATEMENT
Patient is a 17 y/o male presenting to the ED with b/l ear pain/ache, worse on the right x 3 weeks. Patient denies fever/chills, hearing loss, tinnitus. Patient in NAD, resting comfortably, and will continue to monitor.

## 2017-08-29 NOTE — ED PROVIDER NOTE - CONSTITUTIONAL, MLM
normal... disheveled, poor hygiene, awake, alert, oriented to person, place, time/situation and in no apparent distress.

## 2017-08-29 NOTE — ED PROVIDER NOTE - OBJECTIVE STATEMENT
17 yo male hx polysubstance abuse, crystal meth, undomiciled, well known to this ED, here c/o bilateral eye soreness that started today and bilateral ear pain x "a long time" - patient cannot quantify time frame. R ear > Left ear. No fever, no chills. 15 yo male hx polysubstance abuse, crystal meth, undomiciled, well known to this ED, here c/o bilateral eye soreness that started today and bilateral ear pain x "a long time" - patient cannot quantify time frame. R ear > Left ear. No fever, no chills. No headache, no neck pain.

## 2017-08-29 NOTE — ED PROVIDER NOTE - ENMT, MLM
Airway patent, Nasal mucosa clear. Mouth with normal mucosa. Throat has no vesicles, no oropharyngeal exudates and uvula is midline.  R outer ear with mild edema and erythema appears chronic with mild ttp, minimal warmth. no discharge, normal TM with good cone of light. L ear normal exam. Airway patent, Nasal mucosa clear. Mouth with normal mucosa. Throat has no vesicles, no oropharyngeal exudates and uvula is midline.  R outer ear with mild edema and erythema appears chronic with mild ttp, minimal warmth. no discharge, normal TM with good cone of light. no mastoid swelling or tenderness. L ear normal exam, normal outer ear, nml ear canal with good cone of light

## 2017-08-29 NOTE — ED ADULT TRIAGE NOTE - CHIEF COMPLAINT QUOTE
[place] patient with complaints of bilateral earache intermittently for 3 weeks. Patient currently afebrile. No other distress noted.

## 2017-10-27 ENCOUNTER — EMERGENCY (EMERGENCY)
Facility: HOSPITAL | Age: 27
LOS: 1 days | Discharge: HOME | End: 2017-10-27
Attending: EMERGENCY MEDICINE | Admitting: EMERGENCY MEDICINE
Payer: MEDICAID

## 2017-10-27 VITALS
TEMPERATURE: 98 F | HEART RATE: 68 BPM | RESPIRATION RATE: 18 BRPM | DIASTOLIC BLOOD PRESSURE: 85 MMHG | SYSTOLIC BLOOD PRESSURE: 132 MMHG | OXYGEN SATURATION: 99 %

## 2017-10-27 PROCEDURE — 73110 X-RAY EXAM OF WRIST: CPT | Mod: 26,RT

## 2017-10-27 PROCEDURE — 73130 X-RAY EXAM OF HAND: CPT | Mod: 26,RT

## 2017-10-27 PROCEDURE — 99283 EMERGENCY DEPT VISIT LOW MDM: CPT

## 2017-10-27 RX ORDER — TETANUS TOXOID, REDUCED DIPHTHERIA TOXOID AND ACELLULAR PERTUSSIS VACCINE, ADSORBED 5; 2.5; 8; 8; 2.5 [IU]/.5ML; [IU]/.5ML; UG/.5ML; UG/.5ML; UG/.5ML
0.5 SUSPENSION INTRAMUSCULAR ONCE
Qty: 0 | Refills: 0 | Status: COMPLETED | OUTPATIENT
Start: 2017-10-27 | End: 2017-10-27

## 2017-10-27 RX ORDER — CEPHALEXIN 500 MG
500 CAPSULE ORAL ONCE
Qty: 0 | Refills: 0 | Status: COMPLETED | OUTPATIENT
Start: 2017-10-27 | End: 2017-10-27

## 2017-10-27 RX ADMIN — Medication 500 MILLIGRAM(S): at 12:57

## 2017-10-27 RX ADMIN — TETANUS TOXOID, REDUCED DIPHTHERIA TOXOID AND ACELLULAR PERTUSSIS VACCINE, ADSORBED 0.5 MILLILITER(S): 5; 2.5; 8; 8; 2.5 SUSPENSION INTRAMUSCULAR at 13:00

## 2017-10-27 NOTE — ED PROVIDER NOTE - MEDICAL DECISION MAKING DETAILS
plan: xrays, tetanus, will cover w abxs since wound seems dirty and it happened last night. will approximate wounds.
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
## 2017-10-27 NOTE — ED PROVIDER NOTE - SKIN, MLM
abrasion noted to lateral aspect of wrist and V shaped laceration with tissue loss noted in R thumb 4 cm long

## 2017-10-27 NOTE — ED PROVIDER NOTE - PROGRESS NOTE DETAILS
explained finding of 5th digit fracture, pt refused splinting, also pt refused lac local care and repair he just wanted it wrapped. Explained that would increase risk of infection and further complications like a non union of finger fracture. Pt understands and wants to leave the ED. Will prescribe antibiotics to prevent infection. F/U information provided
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
## 2017-10-27 NOTE — ED ADULT NURSE REASSESSMENT NOTE - NS ED NURSE REASSESS COMMENT FT1
Pt. refused sutures by MD, states he does not have time and he wants it to be done later. Pt. educated on the possibility of infection, encouraged to f/u with PMD and to take all antibiotics as prescribed. Verbalized understanding.

## 2017-10-31 DIAGNOSIS — Z79.1 LONG TERM (CURRENT) USE OF NON-STEROIDAL ANTI-INFLAMMATORIES (NSAID): ICD-10-CM

## 2017-10-31 DIAGNOSIS — S61.011A LACERATION WITHOUT FOREIGN BODY OF RIGHT THUMB WITHOUT DAMAGE TO NAIL, INITIAL ENCOUNTER: ICD-10-CM

## 2017-10-31 DIAGNOSIS — S60.811A ABRASION OF RIGHT WRIST, INITIAL ENCOUNTER: ICD-10-CM

## 2017-10-31 DIAGNOSIS — Z79.2 LONG TERM (CURRENT) USE OF ANTIBIOTICS: ICD-10-CM
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
## 2017-10-31 DIAGNOSIS — M79.641 PAIN IN RIGHT HAND: ICD-10-CM

## 2017-10-31 DIAGNOSIS — Z23 ENCOUNTER FOR IMMUNIZATION: ICD-10-CM

## 2017-10-31 DIAGNOSIS — S60.221A CONTUSION OF RIGHT HAND, INITIAL ENCOUNTER: ICD-10-CM

## 2017-10-31 DIAGNOSIS — Z79.899 OTHER LONG TERM (CURRENT) DRUG THERAPY: ICD-10-CM

## 2017-11-10 ENCOUNTER — EMERGENCY (EMERGENCY)
Facility: HOSPITAL | Age: 27
LOS: 1 days | Discharge: HOME | End: 2017-11-10
Attending: EMERGENCY MEDICINE | Admitting: EMERGENCY MEDICINE
Payer: MEDICAID

## 2017-11-10 VITALS
RESPIRATION RATE: 18 BRPM | TEMPERATURE: 98 F | HEART RATE: 99 BPM | SYSTOLIC BLOOD PRESSURE: 125 MMHG | DIASTOLIC BLOOD PRESSURE: 85 MMHG | OXYGEN SATURATION: 100 %

## 2017-11-10 PROCEDURE — 99283 EMERGENCY DEPT VISIT LOW MDM: CPT

## 2017-11-10 RX ADMIN — Medication 1 TABLET(S): at 13:16

## 2017-11-10 NOTE — ED PROVIDER NOTE - PROGRESS NOTE DETAILS
I explained to patient that he has an infection in his thumb which appears to be spreading to other parts of his hand and index finger.  I explained the need for aggressive wound care and IV antibiotics.  I explained the need for oral antibiotics to be taken consistently and the need for very close followup with hand surgeon.  Patient voiced understanding but stated that he has "things to do and will come back later".  Patient was given oral Augmentin, new prescription was sent across to  which patient states he will be able to  this time as now he has ID, etc.  Risks/benefits/alternatives discussed. Patient aggressively refuses labs, IV, IV abx, or wound care beyond dressing (which was performed with xeroform, sterile 4x4 and, kerlex) and states he will come back tomorrow.  He then eloped.
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
## 2017-11-10 NOTE — ED PROVIDER NOTE - MUSCULOSKELETAL, MLM
Spine appears normal, range of motion is not limited, no muscle or joint tenderness, with exception of right hand. See skin note for details.
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
## 2017-11-10 NOTE — ED PROVIDER NOTE - SKIN, MLM
Dominant right thumb has poorly healing laceration in area of metacarpal phalangeal joint on dorsal surface of hand, with surrounding redness and tenderness consistent with cellulitis. Mild swelling and redness to right index finger. Normal cap refill and sensation intact. Dominant right thumb has poorly healing laceration in area of metacarpal phalangeal joint on dorsal surface of hand, with surrounding redness and tenderness consistent with cellulitis. Mild swelling and redness to right index finger. Normal cap refill and sensation intact..  No gabriela pus or abscess present.  No tenderness along any tendons.  ROM all joints intact.

## 2017-11-13 NOTE — ED POST DISCHARGE NOTE - OTHER COMMUNICATION
Could not reach the patient to discuss the reasons as to why he left the ER and did not want to stay for further treatment.

## 2017-11-14 DIAGNOSIS — L03.011 CELLULITIS OF RIGHT FINGER: ICD-10-CM

## 2017-11-14 DIAGNOSIS — Z79.899 OTHER LONG TERM (CURRENT) DRUG THERAPY: ICD-10-CM

## 2017-11-14 DIAGNOSIS — Z79.1 LONG TERM (CURRENT) USE OF NON-STEROIDAL ANTI-INFLAMMATORIES (NSAID): ICD-10-CM

## 2017-11-14 DIAGNOSIS — Z79.2 LONG TERM (CURRENT) USE OF ANTIBIOTICS: ICD-10-CM

## 2017-11-14 DIAGNOSIS — M79.644 PAIN IN RIGHT FINGER(S): ICD-10-CM

## 2018-01-19 ENCOUNTER — EMERGENCY (EMERGENCY)
Facility: HOSPITAL | Age: 28
LOS: 1 days | Discharge: HOME | End: 2018-01-19
Attending: EMERGENCY MEDICINE | Admitting: EMERGENCY MEDICINE
Payer: MEDICAID

## 2018-01-19 VITALS
SYSTOLIC BLOOD PRESSURE: 128 MMHG | DIASTOLIC BLOOD PRESSURE: 87 MMHG | TEMPERATURE: 98 F | OXYGEN SATURATION: 99 % | HEART RATE: 83 BPM | RESPIRATION RATE: 16 BRPM

## 2018-01-19 DIAGNOSIS — Z79.1 LONG TERM (CURRENT) USE OF NON-STEROIDAL ANTI-INFLAMMATORIES (NSAID): ICD-10-CM

## 2018-01-19 DIAGNOSIS — Z79.899 OTHER LONG TERM (CURRENT) DRUG THERAPY: ICD-10-CM

## 2018-01-19 DIAGNOSIS — R05 COUGH: ICD-10-CM

## 2018-01-19 DIAGNOSIS — Z79.2 LONG TERM (CURRENT) USE OF ANTIBIOTICS: ICD-10-CM

## 2018-01-19 DIAGNOSIS — R09.81 NASAL CONGESTION: ICD-10-CM

## 2018-01-19 DIAGNOSIS — R21 RASH AND OTHER NONSPECIFIC SKIN ERUPTION: ICD-10-CM

## 2018-01-19 DIAGNOSIS — H92.09 OTALGIA, UNSPECIFIED EAR: ICD-10-CM

## 2018-01-19 DIAGNOSIS — F17.200 NICOTINE DEPENDENCE, UNSPECIFIED, UNCOMPLICATED: ICD-10-CM

## 2018-01-19 PROCEDURE — 99283 EMERGENCY DEPT VISIT LOW MDM: CPT

## 2018-01-19 RX ORDER — DIPHENHYDRAMINE HCL 50 MG
25 CAPSULE ORAL ONCE
Qty: 0 | Refills: 0 | Status: DISCONTINUED | OUTPATIENT
Start: 2018-01-19 | End: 2018-01-23

## 2018-01-19 NOTE — ED PROVIDER NOTE - MEDICAL DECISION MAKING DETAILS
MSE performed.  benadryl for itching.  TMs clear, lungs clear, no rash.  Conservative management discussed with the patient in detail.  Close PMD follow up encouraged.  Strict ED return instructions discussed in detail and patient given the opportunity to ask any questions about their discharge diagnosis and instructions

## 2018-01-19 NOTE — ED PROVIDER NOTE - OBJECTIVE STATEMENT
Frequent ED visits for a myriad of complaints.  here for cough, chest rash, nasal congestion, ear pain.  gradual, constant, mild.  Does not have a PMD.  Current smoker.

## 2018-06-27 ENCOUNTER — EMERGENCY (EMERGENCY)
Facility: HOSPITAL | Age: 28
LOS: 1 days | Discharge: HOME | End: 2018-06-27
Admitting: EMERGENCY MEDICINE

## 2018-06-27 VITALS
DIASTOLIC BLOOD PRESSURE: 90 MMHG | HEART RATE: 95 BPM | OXYGEN SATURATION: 96 % | RESPIRATION RATE: 18 BRPM | SYSTOLIC BLOOD PRESSURE: 136 MMHG | TEMPERATURE: 98 F

## 2018-06-27 DIAGNOSIS — Z11.4 ENCOUNTER FOR SCREENING FOR HUMAN IMMUNODEFICIENCY VIRUS [HIV]: ICD-10-CM

## 2018-06-28 NOTE — ED POST DISCHARGE NOTE - DETAILS
The patients phone number is no longer in service The patients phone number is no longer in service and he could not be reached for a follow up

## 2018-11-30 NOTE — ED ADULT NURSE NOTE - AGGRAVATING FACTORS
Melolabial Interpolation Flap Text: A decision was made to reconstruct the defect utilizing an interpolation axial flap and a staged reconstruction.  A telfa template was made of the defect.  This telfa template was then used to outline the melolabial interpolation flap.  The donor area for the pedicle flap was then injected with anesthesia.  The flap was excised through the skin and subcutaneous tissue down to the layer of the underlying musculature.  The pedicle flap was carefully excised within this deep plane to maintain its blood supply.  The edges of the donor site were undermined.   The donor site was closed in a primary fashion.  The pedicle was then rotated into position and sutured.  Once the tube was sutured into place, adequate blood supply was confirmed with blanching and refill.  The pedicle was then wrapped with xeroform gauze and dressed appropriately with a telfa and gauze bandage to ensure continued blood supply and protect the attached pedicle. none

## 2019-08-19 NOTE — ED PROVIDER NOTE - NS_EDPROVIDERDISPOUSERTYPE_ED_A_ED
stated I have personally evaluated and examined the patient. The Attending was available to me as a supervising provider if needed.

## 2019-08-24 ENCOUNTER — EMERGENCY (EMERGENCY)
Facility: HOSPITAL | Age: 29
LOS: 1 days | Discharge: HOME | End: 2019-08-24
Admitting: EMERGENCY MEDICINE
Payer: SELF-PAY

## 2019-08-24 VITALS
HEART RATE: 92 BPM | TEMPERATURE: 98 F | OXYGEN SATURATION: 98 % | RESPIRATION RATE: 18 BRPM | SYSTOLIC BLOOD PRESSURE: 149 MMHG | WEIGHT: 160.06 LBS | DIASTOLIC BLOOD PRESSURE: 82 MMHG

## 2019-08-24 PROCEDURE — 99283 EMERGENCY DEPT VISIT LOW MDM: CPT

## 2019-08-24 RX ORDER — MUPIROCIN 20 MG/G
1 OINTMENT TOPICAL
Qty: 1 | Refills: 0
Start: 2019-08-24 | End: 2019-08-30

## 2019-08-24 RX ORDER — AZTREONAM 2 G
1 VIAL (EA) INJECTION
Qty: 14 | Refills: 0
Start: 2019-08-24 | End: 2019-08-30

## 2019-08-24 RX ORDER — BACITRACIN ZINC 500 UNIT/G
1 OINTMENT IN PACKET (EA) TOPICAL ONCE
Refills: 0 | Status: COMPLETED | OUTPATIENT
Start: 2019-08-24 | End: 2019-08-24

## 2019-08-24 RX ORDER — MUPIROCIN 20 MG/G
1 OINTMENT TOPICAL
Qty: 5 | Refills: 0
Start: 2019-08-24 | End: 2019-09-02

## 2019-08-24 RX ADMIN — Medication 2 TABLET(S): at 12:51

## 2019-08-24 RX ADMIN — Medication 1 TABLET(S): at 12:50

## 2019-08-24 RX ADMIN — Medication 1 APPLICATION(S): at 12:51

## 2019-08-24 NOTE — ED PROVIDER NOTE - PHYSICAL EXAMINATION
multiple tract marks over BL upper extremities, dry, with open eschars with underlying erythema and serous draining.  2x2cm area of erythema, induration and fluctuance over volar aspect of R forearm

## 2019-08-24 NOTE — ED ADULT NURSE NOTE - CHIEF COMPLAINT QUOTE
pt has multiple wounds to arms that appear as puncture marks that are scabs. Pt states they are more swollen and oozing then usual.
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
## 2019-08-24 NOTE — ED PROVIDER NOTE - OBJECTIVE STATEMENT
PMHX IVD use last injected 2 weeks ago, homeless presents with increased pain over L arm from wounds.   denies fever, chills, nightsweats. patient is requesting ice for wounds

## 2019-08-28 DIAGNOSIS — L03.114 CELLULITIS OF LEFT UPPER LIMB: ICD-10-CM

## 2019-08-28 DIAGNOSIS — L03.113 CELLULITIS OF RIGHT UPPER LIMB: ICD-10-CM

## 2019-08-28 DIAGNOSIS — L02.414 CUTANEOUS ABSCESS OF LEFT UPPER LIMB: ICD-10-CM

## 2019-08-28 DIAGNOSIS — M79.602 PAIN IN LEFT ARM: ICD-10-CM

## 2019-08-29 NOTE — ED ADULT TRIAGE NOTE - CHIEF COMPLAINT QUOTE
I have placed the below orders and discussed them with an approved delegating provider.  The MA is performing the below orders under the direction of Criss Black MD.    1. Need for vaccination  Vaccine Information statements given for each vaccine if administered. Discussed benefits and side effects of each vaccine given with patient /family, answered all patient /family questions     - DTAP/IPV Combined Vaccine IM (AGE 4-6Y)  - MMR and Varicella Combined Vaccine SQ   pt has multiple wounds to arms that appear as puncture marks that are scabs. Pt states they are more swollen and oozing then usual.

## 2019-09-10 NOTE — ED ADULT TRIAGE NOTE - ACCOMPANIED BY
Self O-T Plasty Text: The defect edges were debeveled with a #15 scalpel blade.  Given the location of the defect, shape of the defect and the proximity to free margins an O-T plasty was deemed most appropriate.  Using a sterile surgical marker, an appropriate O-T plasty was drawn incorporating the defect and placing the expected incisions within the relaxed skin tension lines where possible.    The area thus outlined was incised deep to adipose tissue with a #15 scalpel blade.  The skin margins were undermined to an appropriate distance in all directions utilizing iris scissors.

## 2019-10-01 NOTE — ED PROVIDER NOTE - CHPI ED SYMPTOMS POS
· Likely in setting of acute renal failure  · On oral bicarb as an outpatient  · Nephrology following, appreciate input   · IV bicarb drip at 70 mL/hr   · BMP in AM abrasion to face

## 2019-10-29 ENCOUNTER — EMERGENCY (EMERGENCY)
Facility: HOSPITAL | Age: 29
LOS: 1 days | Discharge: HOME | End: 2019-10-29
Attending: EMERGENCY MEDICINE | Admitting: EMERGENCY MEDICINE
Payer: SELF-PAY

## 2019-10-29 VITALS
OXYGEN SATURATION: 96 % | RESPIRATION RATE: 16 BRPM | HEIGHT: 73 IN | HEART RATE: 109 BPM | SYSTOLIC BLOOD PRESSURE: 93 MMHG | TEMPERATURE: 98 F | WEIGHT: 169.09 LBS | DIASTOLIC BLOOD PRESSURE: 49 MMHG

## 2019-10-29 DIAGNOSIS — L02.11 CUTANEOUS ABSCESS OF NECK: ICD-10-CM

## 2019-10-29 PROCEDURE — 99283 EMERGENCY DEPT VISIT LOW MDM: CPT

## 2019-10-29 RX ORDER — SODIUM CHLORIDE 9 MG/ML
1000 INJECTION INTRAMUSCULAR; INTRAVENOUS; SUBCUTANEOUS ONCE
Refills: 0 | Status: DISCONTINUED | OUTPATIENT
Start: 2019-10-29 | End: 2019-11-04

## 2019-10-29 RX ORDER — VANCOMYCIN HCL 1 G
1000 VIAL (EA) INTRAVENOUS ONCE
Refills: 0 | Status: DISCONTINUED | OUTPATIENT
Start: 2019-10-29 | End: 2019-11-04

## 2019-10-29 RX ORDER — SODIUM CHLORIDE 9 MG/ML
1500 INJECTION INTRAMUSCULAR; INTRAVENOUS; SUBCUTANEOUS ONCE
Refills: 0 | Status: DISCONTINUED | OUTPATIENT
Start: 2019-10-29 | End: 2019-11-04

## 2019-10-29 RX ORDER — ACETAMINOPHEN 500 MG
650 TABLET ORAL ONCE
Refills: 0 | Status: COMPLETED | OUTPATIENT
Start: 2019-10-29 | End: 2019-10-29

## 2019-10-29 RX ADMIN — Medication 650 MILLIGRAM(S): at 21:19

## 2019-10-29 NOTE — ED PROVIDER NOTE - ENMT, MLM
Airway patent, Nasal mucosa clear. Mouth with normal mucosa. Throat has no vesicles, no oropharyngeal exudates and uvula is midline. Circular region of swelling noted to the anterior cricoid area of the neck with erythema, warmth, and fluctuance, no drainage. Patient able to swallow, speaking in full sentences.

## 2019-10-29 NOTE — ED PROVIDER NOTE - PROGRESS NOTE DETAILS
Patient offered needle inspiration I&D, IV, and abx. Patient is refusing all imaging, IV, and procedures. States he wants something topical. I explained the risk of refusing care, procedures, IV, and     admission, including airway compromise, temporary or permanent disability, or death. Patient has insight and capacity to refuse, and is refusing all care, patient accepts these risks.  Pt wants to be discharged despite being advised STRONGLY and REPEATEDLY to stay for labs and further work up.  A&Ox3, speaking clearly and coherently, demonstrates decision-making capacity. Patient is amenable to EMLA and PO abx for now, with possible needle aspiration. However, patient is non-compliant with medical care. Patient is not amenable for admission to [facility]. Will     reassess shortly after EMLA and heat pack. Patient is not currently amenable to further medical care. pt refused to sign ama paperwork.  pt eloped

## 2019-10-29 NOTE — ED PROVIDER NOTE - SKIN, MLM
Bilateral upper extremities covered with open ulcerated lesions of previous injections with scarring, no other signs of acute erythema or cellulitis.

## 2019-10-29 NOTE — ED PROVIDER NOTE - CHPI ED SYMPTOMS NEG
no nausea, vomiting, difficulty swallowing, difficulty breathing, CP, palpitations/no fever/no chills

## 2019-10-29 NOTE — ED PROVIDER NOTE - ATTENDING CONTRIBUTION TO CARE
patient seen at bedside, coherent, alert and oriented x 3, with palpable fluctuant abscess to midline anterior neck, refusing to do needle I&D, or IV abx, or ct soft tissue neck. explained risks of worsening abscess, and infection which could lead to complications/ temp/permanent disability sepsis or death. verbalized understanding, has capacity and insight. placed emla on abscess, for possible I&D, and ordered po abx. patient subsequently eloped.

## 2019-10-29 NOTE — ED PROVIDER NOTE - CLINICAL SUMMARY MEDICAL DECISION MAKING FREE TEXT BOX
Patient with hx of IVDA presents with swelling to the cricoid region of the neck, concern for abscess/bacteremia. Modified work-up initiated. Patient is afebrile at this time. Ordered IV Vancomycin, weight-based fluids, and Tylenol. No airway compromise at this time. Plan to obtain CT with IV contrast of the neck, consult ENT versus plastics, and likely also medicine admission, anticipate I&D.

## 2019-10-29 NOTE — ED PROVIDER NOTE - OBJECTIVE STATEMENT
30 y/o male with PMHx of IVDA presents to ED c/o anterior neck swelling x 3-4 days. Patient admits to injecting IV methamphetamines. However, denies any injections into his neck. Patient denies any fever, chills, nausea, vomiting, difficulty swallowing, or difficulty breathing. Patient denies any hx of thyroid dysfunction or thyroid storm. Also denies any CP, palpitations, or SOB.

## 2020-05-21 ENCOUNTER — EMERGENCY (EMERGENCY)
Facility: HOSPITAL | Age: 30
LOS: 1 days | Discharge: HOME | End: 2020-05-21
Attending: EMERGENCY MEDICINE | Admitting: EMERGENCY MEDICINE
Payer: MEDICAID

## 2020-05-21 VITALS
TEMPERATURE: 98 F | WEIGHT: 190.04 LBS | DIASTOLIC BLOOD PRESSURE: 78 MMHG | HEIGHT: 70 IN | SYSTOLIC BLOOD PRESSURE: 135 MMHG | HEART RATE: 84 BPM | RESPIRATION RATE: 18 BRPM | OXYGEN SATURATION: 98 %

## 2020-05-21 PROCEDURE — 99284 EMERGENCY DEPT VISIT MOD MDM: CPT

## 2020-05-21 RX ORDER — MUPIROCIN 20 MG/G
1 OINTMENT TOPICAL
Qty: 66 | Refills: 1
Start: 2020-05-21 | End: 2020-06-17

## 2020-05-21 RX ORDER — CHLORHEXIDINE GLUCONATE 213 G/1000ML
1 SOLUTION TOPICAL
Qty: 1 | Refills: 0
Start: 2020-05-21 | End: 2020-06-03

## 2020-05-21 RX ORDER — AZTREONAM 2 G
2 VIAL (EA) INJECTION
Qty: 40 | Refills: 0
Start: 2020-05-21 | End: 2020-05-30

## 2020-05-21 RX ORDER — MUPIROCIN 20 MG/G
1 OINTMENT TOPICAL ONCE
Refills: 0 | Status: COMPLETED | OUTPATIENT
Start: 2020-05-21 | End: 2020-05-21

## 2020-05-21 RX ADMIN — MUPIROCIN 1 APPLICATION(S): 20 OINTMENT TOPICAL at 13:48

## 2020-05-21 NOTE — ED PROVIDER NOTE - SKIN, MLM
+Multiple crater like skin lesions that do not look acutely infected (chronically slow healing) to bilateral forearms. Area of folliculitis 3x3 cm to anterior neck at hairline.

## 2020-05-21 NOTE — ED ADULT NURSE NOTE - OBJECTIVE STATEMENT
Pt has multiple wounds to bilateral arms from iv drug use.  Pt requesting that bandage applied.  Pt bilateral arms wrapped with gauze.  Pt reports wound to anterior neck.  Yellow drainage noted.  Culture obtained from wound.  Bandage applied.

## 2020-05-21 NOTE — ED PROVIDER NOTE - OBJECTIVE STATEMENT
29 y o male presents to ED with various slow healing skin wounds secondary to long time IV drug abuse. He reports that sometimes when he is unconscious after using drugs, "people gauge my skin". Pt is worried he might have a staph infection and notes having a weeping area in his neckline, where he shaves, that has not healed for the past 2 months. Pt has hx of previous ED visits from skin abscesses but no culture data. He denies hx of staph infections but has been prescribed Bactrim and Doxycycline many times in past ED visits. Pt is presenting with low medical healthcare knowledge. No fever, chills, or COVID like sx endorsed. No other medical complaints.

## 2020-05-21 NOTE — ED PROVIDER NOTE - NSFOLLOWUPINSTRUCTIONS_ED_ALL_ED_FT
MRSA Infection, Self-Care, Adult  Methicillin-resistant Staphylococcus aureus (MRSA) infection is caused by bacteria that no longer respond to antibiotic medicines. MRSA infection can be hard to treat.  Self-care is important after being diagnosed with MRSA. Following instructions for self-care will:  Help you heal well.Prevent the infection from spreading to others.Your health care provider may also give you more specific instructions. If you have problems or questions, contact your health care provider.  What are the risks?  MRSA can be on the skin or in the nose of many people without causing problems. This is called MRSA colonization. However, MRSA can cause problems for you and others if it enters the body through a cut, a sore, or an invasive medical procedure or device.  If MRSA enters your body, it may cause serious problems, such as:  Skin infections.Bone or joint infections.Pneumonia.Bloodstream infections (sepsis).If you have these infections, MRSA may spread to others, including:  Visitors or other patients in the hospital.Friends, family, or other people at home or in your community.Supplies needed:  Soap and water.Germ-free (sterile) dressing.Alcohol-based hand  (optional).Home cleaning solutions that contain bleach.Clean or disposable towels.How to prevent MRSA infections  Take these steps to avoid getting another MRSA infection and to prevent the bacteria from spreading to others.  Hand washing        Wash your hands frequently with soap and water. If soap and water are not available, use an alcohol-based hand . Dry your hands with a clean or disposable towel.Make sure that everyone in the household washes their hands often.Wound care     If you have a wound, follow instructions from your health care provider about how to take care of your wound. Make sure you:  Wash your hands with soap and water before and after you change your bandage (dressing). If soap and water are not available, use an alcohol-based hand .Change your dressing as told by your health care provider.Leave any stitches (sutures), skin glue, or adhesive strips in place. These skin closures may need to be in place for 2 weeks or longer. If adhesive strip edges start to loosen and curl up, you may trim the loose edges. Do not remove adhesive strips completely unless your health care provider tells you to do that.Clean wounds, cuts, and abrasions with soap and water and cover them with dry, sterile dressings until they heal.Check your wound every day for signs of infection. Check for:  More redness, swelling, or pain.More fluid or blood.Warmth.Pus or a bad smell.If you have a wound that seems to be infected, ask your health care provider if a culture should be done for MRSA and other bacteria.Personal hygiene    Maintain good hygiene by bathing often and keeping your body clean.Wash hands before preparing food.Always shower after exercising.Wash towels, bedding, and clothes in the washing machine with detergent and hot water. Dry them in a hot dryer.General tips    Take your antibiotic medicine as told by your health care provider. Take them only when absolutely necessary. Do not stop taking the antibiotic even if you start to feel better.Avoid close contact with others as much as possible.Do not use towels, razors, toothbrushes, bedding, or other items that will be used by others.Clean surfaces regularly to remove germs (disinfection). Use products or solutions that contain bleach. Make sure you disinfect bathroom surfaces, food preparation areas, and doorknobs.Follow these instructions at home:  Take over-the-counter and prescription medicines only as told by your health care provider.Tell all health care providers who care for you that you have MRSA or that you have had MRSA.If you are breastfeeding, talk to your health care provider about MRSA. You may be asked to temporarily stop breastfeeding.If you have an invasive medical device, make sure that you know how to take care of it to prevent infection.Keep all follow-up visits as told by your health care provider. This is important.Contact a health care provider if:  Your infection seems to be getting worse. Signs may include:  Warmth, redness, or tenderness around your wound site.A red line that spreads from your infection site.A dark color in the area around your infection.Wound drainage that is tan, yellow, or green.A bad smell coming from your wound.You have symptoms of a new MRSA infection:  A pus-filled pimple.A boil on your skin.Pus draining from your skin.A sore (abscess) under your skin or somewhere in your body.Fever with or without chills.Get help right away if:  You have trouble breathing.You feel nauseous, you vomit, or you cannot take medicine without vomiting.You have chest pain.These symptoms may represent a serious problem that is an emergency. Do not wait to see if the symptoms will go away. Get medical help right away. Call your local emergency services (911 in the [place] Do not drive yourself to the hospital.   Summary  Self-care is important after being diagnosed with MRSA. This will help you heal well and prevent the infection from spreading to others.Wash your hands often, avoid close contact with others, and avoid sharing towels, razors, toothbrushes, and bedding with other people. This will help prevent the infection from spreading to others.If you are being treated for a MRSA infection, make sure to take over-the-counter and prescription medicines as told. Finish all antibiotic medicine even if you start to feel better.If you have a wound, follow instructions from your health care provider about how to take care of it. Check your wound every day for signs of infection.This information is not intended to replace advice given to you by your health care provider. Make sure you discuss any questions you have with your health care provider.

## 2020-05-21 NOTE — ED PROVIDER NOTE - PATIENT PORTAL LINK FT
You can access the FollowMyHealth Patient Portal offered by [facility] by registering at the following website: [email or web address]. By joining [facility]’s FollowMyHealth portal, you will also be able to view your health information using other applications (apps) compatible with our system.

## 2020-05-21 NOTE — ED PROVIDER NOTE - RESPIRATORY NEGATIVE STATEMENT, MLM
no cough and no shortness of breath.
Patient is a 71 year old male with swelling of bilateral legs and erythematous rash. Plan for IV antibiotics, dermatology evaluation.

## 2020-05-22 LAB — METHOD TYPE: SIGNIFICANT CHANGE UP

## 2020-05-23 LAB
-  AMPICILLIN/SULBACTAM: SIGNIFICANT CHANGE UP
-  CEFAZOLIN: SIGNIFICANT CHANGE UP
-  CLINDAMYCIN: SIGNIFICANT CHANGE UP
-  ERYTHROMYCIN: SIGNIFICANT CHANGE UP
-  GENTAMICIN: SIGNIFICANT CHANGE UP
-  OXACILLIN: SIGNIFICANT CHANGE UP
-  PENICILLIN: SIGNIFICANT CHANGE UP
-  RIFAMPIN: SIGNIFICANT CHANGE UP
-  TETRACYCLINE: SIGNIFICANT CHANGE UP
-  TRIMETHOPRIM/SULFAMETHOXAZOLE: SIGNIFICANT CHANGE UP
-  VANCOMYCIN: SIGNIFICANT CHANGE UP
METHOD TYPE: SIGNIFICANT CHANGE UP

## 2020-05-23 NOTE — ED POST DISCHARGE NOTE - DETAILS
contacted number and it is incorrect. will send certifed letter, patient currently on bactrim and culture resistant. certified letter to be sent.

## 2020-05-24 LAB
-  AMPICILLIN/SULBACTAM: SIGNIFICANT CHANGE UP
-  CEFAZOLIN: SIGNIFICANT CHANGE UP
-  CLINDAMYCIN: SIGNIFICANT CHANGE UP
-  DAPTOMYCIN: SIGNIFICANT CHANGE UP
-  ERYTHROMYCIN: SIGNIFICANT CHANGE UP
-  GENTAMICIN: SIGNIFICANT CHANGE UP
-  LINEZOLID: SIGNIFICANT CHANGE UP
-  OXACILLIN: SIGNIFICANT CHANGE UP
-  PENICILLIN: SIGNIFICANT CHANGE UP
-  RIFAMPIN: SIGNIFICANT CHANGE UP
-  TETRACYCLINE: SIGNIFICANT CHANGE UP
-  TRIMETHOPRIM/SULFAMETHOXAZOLE: SIGNIFICANT CHANGE UP
-  VANCOMYCIN: SIGNIFICANT CHANGE UP
METHOD TYPE: SIGNIFICANT CHANGE UP

## 2020-05-25 DIAGNOSIS — L08.9 LOCAL INFECTION OF THE SKIN AND SUBCUTANEOUS TISSUE, UNSPECIFIED: ICD-10-CM

## 2020-05-25 DIAGNOSIS — Z79.899 OTHER LONG TERM (CURRENT) DRUG THERAPY: ICD-10-CM

## 2020-05-25 DIAGNOSIS — Z79.1 LONG TERM (CURRENT) USE OF NON-STEROIDAL ANTI-INFLAMMATORIES (NSAID): ICD-10-CM

## 2020-05-25 DIAGNOSIS — Z79.2 LONG TERM (CURRENT) USE OF ANTIBIOTICS: ICD-10-CM

## 2020-05-25 DIAGNOSIS — Z48.00 ENCOUNTER FOR CHANGE OR REMOVAL OF NONSURGICAL WOUND DRESSING: ICD-10-CM

## 2020-05-26 LAB
CULTURE RESULTS: SIGNIFICANT CHANGE UP
ORGANISM # SPEC MICROSCOPIC CNT: SIGNIFICANT CHANGE UP
SPECIMEN SOURCE: SIGNIFICANT CHANGE UP

## 2020-06-23 ENCOUNTER — EMERGENCY (EMERGENCY)
Facility: HOSPITAL | Age: 30
LOS: 1 days | Discharge: HOME | End: 2020-06-23
Admitting: EMERGENCY MEDICINE
Payer: MEDICAID

## 2020-06-23 VITALS
TEMPERATURE: 98 F | WEIGHT: 175.05 LBS | HEART RATE: 102 BPM | OXYGEN SATURATION: 96 % | DIASTOLIC BLOOD PRESSURE: 81 MMHG | RESPIRATION RATE: 17 BRPM | SYSTOLIC BLOOD PRESSURE: 134 MMHG

## 2020-06-23 PROCEDURE — 99284 EMERGENCY DEPT VISIT MOD MDM: CPT

## 2020-06-23 NOTE — ED ADULT TRIAGE NOTE - CHIEF COMPLAINT QUOTE
Pt walked into ED c.o rash to bl arms and neck. Pt state "I was seen in the ed a couple weeks ago and they prescribed me a cream but I left the paperwork at my friends and I didn't get it but I think its getting worse". Pt denies f/chills/itching. Pt c.o 2/10 discomfort from irritation to rash

## 2020-06-24 LAB
APPEARANCE UR: CLEAR — SIGNIFICANT CHANGE UP
BILIRUB UR-MCNC: ABNORMAL
C TRACH RRNA SPEC QL NAA+PROBE: SIGNIFICANT CHANGE UP
COLOR SPEC: SIGNIFICANT CHANGE UP
DIFF PNL FLD: NEGATIVE — SIGNIFICANT CHANGE UP
GLUCOSE UR QL: NEGATIVE — SIGNIFICANT CHANGE UP
KETONES UR-MCNC: NEGATIVE — SIGNIFICANT CHANGE UP
LEUKOCYTE ESTERASE UR-ACNC: NEGATIVE — SIGNIFICANT CHANGE UP
N GONORRHOEA RRNA SPEC QL NAA+PROBE: SIGNIFICANT CHANGE UP
NITRITE UR-MCNC: NEGATIVE — SIGNIFICANT CHANGE UP
PH UR: 6 — SIGNIFICANT CHANGE UP (ref 5–8)
PROT UR-MCNC: 30 MG/DL
SP GR SPEC: >=1.03 — SIGNIFICANT CHANGE UP (ref 1–1.03)
SPECIMEN SOURCE: SIGNIFICANT CHANGE UP
T PALLIDUM AB TITR SER: POSITIVE
UROBILINOGEN FLD QL: 1 E.U./DL — SIGNIFICANT CHANGE UP

## 2020-06-24 RX ORDER — MUPIROCIN 20 MG/G
1 OINTMENT TOPICAL
Qty: 66 | Refills: 1
Start: 2020-06-24 | End: 2020-07-21

## 2020-06-24 RX ORDER — PENICILLIN G BENZATHINE 1200000 [IU]/2ML
2.4 INJECTION, SUSPENSION INTRAMUSCULAR ONCE
Refills: 0 | Status: COMPLETED | OUTPATIENT
Start: 2020-06-24 | End: 2020-06-24

## 2020-06-24 RX ORDER — AZITHROMYCIN 500 MG/1
1000 TABLET, FILM COATED ORAL ONCE
Refills: 0 | Status: COMPLETED | OUTPATIENT
Start: 2020-06-24 | End: 2020-06-24

## 2020-06-24 RX ORDER — CEFTRIAXONE 500 MG/1
250 INJECTION, POWDER, FOR SOLUTION INTRAMUSCULAR; INTRAVENOUS ONCE
Refills: 0 | Status: COMPLETED | OUTPATIENT
Start: 2020-06-24 | End: 2020-06-24

## 2020-06-24 RX ORDER — AZTREONAM 2 G
2 VIAL (EA) INJECTION
Qty: 40 | Refills: 0
Start: 2020-06-24 | End: 2020-07-03

## 2020-06-24 RX ORDER — CHLORHEXIDINE GLUCONATE 213 G/1000ML
1 SOLUTION TOPICAL
Qty: 1 | Refills: 0
Start: 2020-06-24 | End: 2020-07-07

## 2020-06-24 RX ADMIN — AZITHROMYCIN 1000 MILLIGRAM(S): 500 TABLET, FILM COATED ORAL at 01:49

## 2020-06-24 RX ADMIN — CEFTRIAXONE 250 MILLIGRAM(S): 500 INJECTION, POWDER, FOR SOLUTION INTRAMUSCULAR; INTRAVENOUS at 01:49

## 2020-06-24 NOTE — ED POST DISCHARGE NOTE - DETAILS
The first number listed for the patient is not working and the 2nd number listed does not belong to him Certified Letter Sent to contact us to discuss results.

## 2020-06-24 NOTE — ED PROVIDER NOTE - NSFOLLOWUPINSTRUCTIONS_ED_ALL_ED_FT
A sexually transmitted disease (STD) is a disease or infection that may be passed (transmitted) from person to person, usually during sexual activity. This may happen by way of saliva, semen, blood, vaginal mucus, or urine. Symptoms vary depending on the type of STD acquired and may include pain in the groin, discharge, and lesions or a rash. If you are started on an antibiotic, take it exactly as instructed. Avoid sexual contact of any kind until cleared by a health care professional. Contact your sexual partner(s) to inform them of your diagnosis so that they may be tested and treated as well.    Please take your medications as prescribed and follow up with Dr. Perez (Dermatologist) and Dr. Hernandez (Primary Care) this week for re-evaluation and further management.    PLEASE RETURN TO THE ER IMMEDIATELY IF YOU HAVE ANY OF THE FOLLOWING SYMPTOMS: severe abdominal pain, high fever, nausea/vomiting, or unintended weight loss, or for any other new or sudden concerns.

## 2020-06-24 NOTE — ED PROVIDER NOTE - CARE PLAN
Principal Discharge DX:	Syphilis  Secondary Diagnosis:	MRSA (methicillin resistant Staphylococcus aureus) infection

## 2020-06-24 NOTE — ED PROVIDER NOTE - OBJECTIVE STATEMENT
30 y/o M Hx IVDA with methamphetamine presents to the ED c/o rash to B/L UE's and LE's of unknown duration. Pt states he has been seen for this complaint in another ED 2 weeks ago and was prescribed a cream he cannot recall the name of, however he lost the paperwork for the prescription. He also mentions that he has a wound to his penis which has been presents for the past 1-2 weeks which he attributes to someone's tooth while he was receiving oral sex 2 weeks ago. Denies fever, chills, body aches or arthralgias, CP, SOB, abdo pain, N/V/D, back or flank pain, dysuria, hematuria, frequency, penile discharge, testicular pain or scrotal swelling

## 2020-06-24 NOTE — ED ADULT NURSE NOTE - OBJECTIVE STATEMENT
30 yo M c/o rash to B/L arms, neck, and penis. Pt states he has had the rash for about a month, and was prescribed cream for it but never picked up the prescription from his previous ED visit. Noted circular redness with scaling and swelling to B/L pt arms and neck. Denies medical hx, denies IV drug use. C/o itching and discomfort. Denies CP, SOB, N/V/D, headache, dizziness, fever/chills, numbness/tingling, change in bowel or bladder habits. Pt speaking in full complete sentences, ambulatory with steady gait.

## 2020-06-24 NOTE — ED ADULT NURSE REASSESSMENT NOTE - NS ED NURSE REASSESS COMMENT FT1
Pt medicated as ordered, refused penicillin injections at first. Educated pt as to why the medication is important for his condition and that his condition could severely worsen if left untreated. Pt educated by RN and PA. Pt requested time to consider the information. When RN attempted reassessment, pt was not in room or anywhere in ED. Security confirmed seeing the pt leave. PA aware.

## 2020-06-24 NOTE — ED PROVIDER NOTE - PROGRESS NOTE DETAILS
Pt refused the PCN IM injection and states he wanted 5 minutes to think about it. I thoroughly explained the importance of the injection as well as the risks associated with withholding the injection including syphilis complications, neurosyphilis and death. Upon returning to the pt's room 5 minutes later, he appears to have eloped from the ED.  Entire ED and waiting area searched by staff.  Patient not found.  Will have SW attempt to contact patient.

## 2020-06-24 NOTE — ED ADULT NURSE NOTE - CHPI ED NUR SYMPTOMS NEG
no fever/no pain/no vomiting/no chills/no body aches/no decreased eating/drinking/no confusion/no petechia

## 2020-06-24 NOTE — ED PROVIDER NOTE - SKIN, MLM
+ scattered maculopapular lesions with superficial ~1cm ulcerated wounds to B/L UE's and LE's. No active bleeding or discharge.

## 2020-06-24 NOTE — ED PROVIDER NOTE - CLINICAL SUMMARY MEDICAL DECISION MAKING FREE TEXT BOX
Clinical presentation is concerning for possible Syphilis. Pt's skin lesions also are suspicious for MRSA. Will obtain UA/cx, GC/CT, Syphilis screen, administer Rocephin, Zithromax and PCN G 2.4 million units. Will prescribe Bactrim DS, Hibiclens and Mupirocin, and have pt F/U with Dermatology and Primary Care this week. Strict return precautions reviewed with pt in which pt verbalizes understanding and agrees to. Clinical presentation is concerning for possible Syphilis. Pt's skin lesions also are suspicious for MRSA. Will obtain UA/cx, GC/CT, Syphilis screen, administer Rocephin, Zithromax and PCN G 2.4 million units. Will prescribe Bactrim DS, Hibiclens and Mupirocin, and have pt F/U with Dermatology and Primary Care this week.

## 2020-06-25 LAB
CULTURE RESULTS: SIGNIFICANT CHANGE UP
SPECIMEN SOURCE: SIGNIFICANT CHANGE UP

## 2020-06-27 DIAGNOSIS — R21 RASH AND OTHER NONSPECIFIC SKIN ERUPTION: ICD-10-CM

## 2020-06-27 DIAGNOSIS — A51.2: ICD-10-CM

## 2020-06-27 DIAGNOSIS — B95.62 METHICILLIN RESISTANT STAPHYLOCOCCUS AUREUS INFECTION AS THE CAUSE OF DISEASES CLASSIFIED ELSEWHERE: ICD-10-CM

## 2020-07-26 NOTE — ED ADULT TRIAGE NOTE - NS ED NURSE DIRECT TO ROOM YN
Pt presents to ED c/o right eye pain, redness, itching and irritation since yesterday afternoon. Pt states she takes her goggles on and off at work and thinks she may have scratched or poked herself in the eye.Denies blurriness of vision changes. Clear drainage noted.  
No

## 2020-09-01 NOTE — ED PROVIDER NOTE - CPE EDP NEURO NORM
I reviewed the H&P, I examined the patient, and there are no changes in the patient's condition.  Small right renal abscess  Aspiration  Too small for drain  
normal...

## 2020-09-18 ENCOUNTER — EMERGENCY (EMERGENCY)
Facility: HOSPITAL | Age: 30
LOS: 1 days | Discharge: HOME | End: 2020-09-18
Attending: EMERGENCY MEDICINE | Admitting: EMERGENCY MEDICINE
Payer: MEDICAID

## 2020-09-18 VITALS
HEIGHT: 78 IN | OXYGEN SATURATION: 99 % | RESPIRATION RATE: 18 BRPM | TEMPERATURE: 98 F | SYSTOLIC BLOOD PRESSURE: 108 MMHG | HEART RATE: 66 BPM | DIASTOLIC BLOOD PRESSURE: 71 MMHG

## 2020-09-18 DIAGNOSIS — L08.9 LOCAL INFECTION OF THE SKIN AND SUBCUTANEOUS TISSUE, UNSPECIFIED: ICD-10-CM

## 2020-09-18 PROCEDURE — 99283 EMERGENCY DEPT VISIT LOW MDM: CPT

## 2020-09-18 RX ORDER — AZTREONAM 2 G
2 VIAL (EA) INJECTION
Qty: 40 | Refills: 0
Start: 2020-09-18 | End: 2020-09-27

## 2020-09-18 RX ORDER — MUPIROCIN 20 MG/G
1 OINTMENT TOPICAL
Qty: 66 | Refills: 1
Start: 2020-09-18 | End: 2020-10-15

## 2020-09-18 RX ORDER — CHLORHEXIDINE GLUCONATE 213 G/1000ML
1 SOLUTION TOPICAL
Qty: 1 | Refills: 0
Start: 2020-09-18 | End: 2020-09-27

## 2020-09-18 NOTE — ED PROVIDER NOTE - OBJECTIVE STATEMENT
29 y o male presents to ED seen by me in May 2020 now presents again with failure skin lesions 2/2 presumed MRSA. He picked up some of his meds from prev visit then lost the them. He has with various slow healing skin wounds secondary to long time IV drug abuse. Pt has hx of previous ED visits from skin abscesses but no culture data. Previously prescribed Bactrim and Doxycycline many times in past ED visits. He is still activley using Crystal meth and is a little jumpy on exam (though cooperative). No abscesses currently, just crusted and weeping skin lesions. No fever, chills, or COVID like sx endorsed. No other medical complaints.

## 2020-09-18 NOTE — ED PROVIDER NOTE - PATIENT PORTAL LINK FT
You can access the FollowMyHealth Patient Portal offered by [facility] by registering at the following website: [email or web address]. By joining [facility]’s FollowMyHealth portal, you will also be able to view your health information using other applications (apps) compatible with our system.

## 2020-09-18 NOTE — ED PROVIDER NOTE - SKIN, MLM
Innumerable old scare, blisters and some crusting skin lesions 2/2 skin popping, non feel acutely abscessed.

## 2020-09-18 NOTE — ED ADULT TRIAGE NOTE - CHIEF COMPLAINT QUOTE
pt has scars and sores in various stages of healing on bl upper extremities. states 2 are new, hx mrsa. pt has additional chart. refuses to discuss substance abuse hx in triage area. [id number]

## 2020-09-18 NOTE — ED PROVIDER NOTE - PSYCHIATRIC, MLM
Alert and oriented to person, place, time/situation. mood and affect consistent with chronic methamphetamine use, jumpy, fidgety, cooperative, no apparent risk to self or others.

## 2021-08-06 NOTE — ED PROVIDER NOTE - CROS ED GI ALL NEG
Additional Area 4 Volume In Cc: 0 Lot #: [id number] Include Cannula Information In Note?: No Anesthesia Volume In Cc: 0.5 Detail Level: Simple Consent: Written consent was obtained. Expiration Date (Month Year): 1/31/2024 Map Statment: See Attach Map for Complete Details Post-Care Instructions: After care instructions were provided to the patient. Filler: Brian Chapa Include Cannula Information In Note?: Yes Include Cannula Size?: 27G Anesthesia Type: 1% lidocaine with epinephrine negative...

## 2022-01-20 NOTE — ED PROVIDER NOTE - CONSTITUTIONAL MENTATION
"   Hospital Medicine Service -  Pre-Operative Consultation       Patient Name: Cecily Cuenca  Referring Surgeon: Dr. Leos    Reason for Referral: Pre-Operative Evaluation  Surgical Procedure: Right knee replacement  Operative Date: 2/10/2022  Other Providers:      PCP: Unable, To Obtain Pcp          HISTORY OF PRESENT ILLNESS      Cecily Cuenca is a 74 y.o. female presenting today to the [facility] at [facility] for pre-operative evaluation prior to planned surgery.    Patient describes a right knee pain associated with limping for \"many years\".  She has the discomfort every day, rated anywhere from a 2-10 out of 10 in severity.  Her symptoms are getting worse.  Patient has tried outpatient conservative measures include physical therapy and injections, unfortunately she remains symptomatic.  Patient to follow-up with orthopedic surgery now for planned right knee replacement on 2/10/2022    In regards to medical history:  Hypertension-controlled  Diabetes-controlled    The patient denies any current or recent chest pain or pressure, dyspnea, cough, sputum, fevers, chills, abdominal pain, nausea, vomiting, diarrhea or other symptoms.     Functionally, the patient is able to ascend a flight or so of stairs with no dyspnea or chest pain.     The patient denies, on specific questioning, the following:  No history of MI, arrhythmia,or CHF.  No history of VINNIE.  No history of DVT/PE.  No history of COPD.  No history of CVA.    No history of CKD.     PAST MEDICAL AND SURGICAL HISTORY      Past Medical History:   Diagnosis Date   • Arthritis     shoulders, knees, feet, low back, neck   • COVID-19 vaccine series completed     Pfizer x2 plus booster   • Depression     History of   • Diverticulitis of colon     past hx   • History of MRSA infection 2018    buttocks   • History of transfusion     POST OP TONSILLECTOMY AGE 6   • Hyperthyroidism    • Lipid disorder    • " "Sciatica without back pain, left     to the foot   • Skin cancer     BASAL CELL REMOVED FROM NOSE       Past Surgical History:   Procedure Laterality Date   • CHOLECYSTECTOMY     • COLONOSCOPY     • EYE SURGERY Bilateral    • HYSTERECTOMY     • TONSILLECTOMY         MEDICATIONS        Current Outpatient Medications:   •  gabapentin 400 mg capsule, Take 400 mg by mouth 4 (four) times a day.  , Disp: , Rfl:   •  hydrochlorothiazide 25 mg tablet, Take 25 mg by mouth every morning.  , Disp: , Rfl:   •  losartan 100 mg tablet, Take 100 mg by mouth every morning.  , Disp: , Rfl:   •  meloxicam 7.5 mg tablet, Take 7.5 mg by mouth nightly.  , Disp: , Rfl:   •  metFORMIN 500 mg tablet, Take 500 mg by mouth nightly.  , Disp: , Rfl:   •  pravastatin 40 mg tablet, Take 40 mg by mouth nightly.  , Disp: , Rfl:     ALLERGIES      Patient has no known allergies.    FAMILY HISTORY      family history is not on file.    Denies any prior known family history of DVTs/PEs/clotting disorder    SOCIAL HISTORY      Social History     Tobacco Use   • Smoking status: Former Smoker     Packs/day: 0.50     Years: 20.00     Pack years: 10.00     Types: Cigarettes     Quit date: 2005     Years since quittin.0   • Smokeless tobacco: Never Used   Substance Use Topics   • Alcohol use: Never   • Drug use: Never       REVIEW OF SYSTEMS      All other systems reviewed and negative except as noted in HPI    PHYSICAL EXAMINATION      Visit Vitals  BP (!) 140/58   Pulse 83   Temp 37.1 °C (98.8 °F) (Oral)   Resp 18   Ht 1.702 m (5' 7\")   Wt 59.3 kg (130 lb 11.2 oz)   SpO2 98%   BMI 20.47 kg/m²     Body mass index is 20.47 kg/m².    Physical Exam  General:    Alert, cooperative, no acute distress   Head:    Normocephalic, atraumatic   Eyes:   EOMI BL            Lungs:     Clear to auscultation bilaterally, respirations unlabored   Heart:    RRR, S1 and S2 normal, no m/r/g   Abdomen:     Soft, non-tender   Extremities:  Musculoskeletal:  Right " knee crepitus on flexion extension  Moves all limbs               Neurologic:  Behavior/Emotional:   AAOx3. Grossly nonfocal    Appropriate, cooperative       LABS / EKG        Labs  Lab Results   Component Value Date     01/20/2022    K 3.9 01/20/2022     01/20/2022    CO2 26 01/20/2022    BUN 29 (H) 01/20/2022    CREATININE 0.7 01/20/2022    WBC 4.68 01/20/2022    HGB 15.8 (H) 01/20/2022    HCT 46.6 (H) 01/20/2022     01/20/2022    INR 1.0 01/20/2022       ECG/Telemetry  I have independently reviewed the ECG. No significant findings.    ASSESSMENT AND PLAN         Osteoarthritis of right knee  Patient describes right knee limping and pain for many years, she has failed outpatient conservative measures including PT and injections  Patient to follow-up with OP surgery now for a planned right knee replacement on 2/10/2022  Additional perioperative recommendations outlined below.    Preop examination  Patient for planned right knee replacement on 2/10/2022  RCRI 0.4% chance of major cardiac event- this acceptable risk for the proposed procedure  Patient will hold her home losartan on a.m. of surgery  Additional recommendations outlined below    Primary hypertension  BP controlled  Patient will hold her home losartan on a.m. of surgery, can add back as clinically indicated postop  Noted patient takes her hydrochlorothiazide in the evenings, okay to take the night prior to surgery, can add back as clinically indicated postop    Monitor BP postop    Diabetes (CMS/[facility])  Controlled, with a recent reported hemoglobin A1c of 5.9  Patient usually takes metformin in the evenings, okay to take the night prior to surgery  Recommend point-of-care Accu-Cheks and sliding scale insulin postop  Can resume metformin on discharge with continued outpatient follow-up with her PCP.    Hyperlipidemia  Continue home meds postop       In regards to perioperative cardiac risk:  The patient denies any history of ischemic  heart disease, denies any history of CHF, denies any history of CVA, is not on pre-operative treatment with insulin, and does not have a pre-operative creatinine > 2 mg/dL.   The Revised Cardiac Risk Index (RCRI) for this patient indicates 0.4% risk.  This is acceptable risk for the proposed procedure    Further comments:  Resume supplements when OK with surgical team.  I would encourage incentive spirometry to assist with minimizing florence-operative pulmonary risk.  DVT prophylaxis and timing of such per the discretion of the surgeon.     Please do not hesitate to contact [facility] during the upcoming hospitalization with any questions or concerns.    Additional Recommendations:     Delirium  Given her age, I would attempt to minimize any narcotics. She theoretically is at risk for post-operative delirium. Should this develop, I would encourage conservative measures, supportive care, re-orientation tactics, and attempt to avoid pharmacologic utilization. Additionally, please mobilize when safe from a surgical standpoint. Prevent dehydration and avoid constipation. Maintain oxygenation. Address sensory deprivation. Avoid jose catheters and restraints as possible and re-orient as appropriate. Maintain adequate pain control. Avoid unnecessary sedating drugs. Minimize sleep disruptions and maintain sleep wake cycle.          Loc Alba MD  1/20/2022              awake/alert/oriented to person, place, time/situation

## 2022-02-11 NOTE — ED PROVIDER NOTE - PRINCIPAL DIAGNOSIS
Diarrhea Cheiloplasty (Complex) Text: A decision was made to reconstruct the defect with a  cheiloplasty.  The defect was undermined extensively.  Additional orbicularis oris muscle was excised with a 15 blade scalpel.  The defect was converted into a full thickness wedge to facilite a better cosmetic result.  Small vessels were then tied off with 5-0 monocyrl. The orbicularis oris, superficial fascia, adipose and dermis were then reapproximated.  After the deeper layers were approximated the epidermis was reapproximated with particular care given to realign the vermilion border.

## 2022-05-24 NOTE — ED PROVIDER NOTE - CROS ED CONS ALL NEG
Please follow up with your primary care doctor and the electrophysiology doctor, Dr. Tenorio, in 2 weeks (the electrophysiology office will call you to schedule your appointment. to follow up with your cardiac MRI results and for further treatment and management.   Please discontinue your home diltiazem for your Raynaud's Syndrome at this time and follow up with your doctors. negative...

## 2022-09-10 NOTE — ED PROVIDER NOTE - CONSTITUTIONAL MENTATION
1000 Patient arrived on the unit with complaints of contractions. SVE 6/75/-2. Will admit patient for delivery and update MD.     1030 Dr Kimani Dale at the bedside assessing the patient and getting consent for epidural.    [id number] for epidural done with Dr Kimani Dale, RN, patient and . [address] at the bedside assessing the patient and discussing plan of care with patient. Patient agree to AROM and  if ready will start pushing. 1859 Bedside and Verbal shift change report given to Janelle Ramirez RN (oncoming nurse) by Alec York RN (offgoing nurse). Report included the following information SBAR, Kardex, and MAR. awake/oriented to person, place, time/situation/alert

## 2022-09-28 NOTE — ED PROVIDER NOTE - MEDICAL DECISION MAKING DETAILS
No difficulties
pt with perichondritis of the R ear auricle x 1 month, now with worsening pain and firmness, unable to drain in the ED, will try a course of po abx, encouraged prompt f/u with ENT for outpt drainage, AFVSS at time of d/c, pt non-toxic appearing and hemodynamically stable, results, ddx, and f/u plans discussed with pt, verbalized understanding

## 2022-11-22 NOTE — ED PROVIDER NOTE - CPE EDP SKIN NORM
Additional Notes: *Flu vaccine received during this flu season. Quality 111:Pneumonia Vaccination Status For Older Adults: Pneumococcal vaccine (PPSV23) administered on or after patient’s 60th birthday and before the end of the measurement period Quality 110: Preventive Care And Screening: Influenza Immunization: Influenza Immunization previously received during influenza season Detail Level: Detailed normal...

## 2023-03-21 NOTE — ED ADULT NURSE NOTE - FALL HARM RISK TYPE OF ASSESSMENT
PICKENS (nonalcoholic steatohepatitis) 3/13/2018    -- liver biopsy 3/2/Aidan Adams MD;  Location: [address]);  Service: Endoscopy;  Laterality: N/A;  colon cancer screening     ESOPHAGOGASTRODUODENOSCOPY N/A 3/21/2019    Procedure: EGD (ESOPHAGOGASTRODUODENOSCOPY);  Surgeon: Rashi Cortez MD;  Location: [facility] ([address] [place] [address]);  Service: Endoscopy;  Laterality: N/A;  varices  surveillance, labs prior-MS   BMI 44.28    ESOPHAGOGASTRODUODENOSCOPY N/A 2021    Procedure: EGD (ESOPHAGOGASTRODUODENOSCOPY);  Surgeon: Aidan Adams MD;  Location: [facility] (4TH FLR);  Gilmar Johansen MD;  Location: Vanderbilt Stallworth Rehabilitation Hospital Gilmar Johansen MD;  Location: Vanderbilt Stallworth Rehabilitation Hospital Daily Assessment
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
## 2024-03-15 NOTE — ED PROVIDER NOTE - TOBACCO USE
Group Therapy Note    Date: 3/15/2024    Group Start Time: 1100  Group End Time: 1145  Group Topic: Cognitive Skills    [facility]        Group Therapy Note    Attendees: 7/9     Patient's Goal: Topic: To increase social interaction, practice problem solving, and   communication skills.          Notes:  Pt was pleasant and cooperative, and participated fully in group task. Pt was   able to practice problem solving, and communication skills.independently.         Status After Intervention:  Improved     Participation Level: Active Listener and Interactive r/t  task and topic, supportive      Participation Quality: Appropriate, Attentive, engaged r/t task and topic, supportive     Speech:  normal .       Thought Process/Content: Logical, linear r/t task and topic .       Affective Functioning: Blunted, brightened.       Mood: Cooperative, euthymic.      Level of consciousness:  Alert, and Attentive      Response to Learning: Able to verbalize current knowledge/experience, Able to   verbalize/acknowledge new learning, and Progressing to goal      Endings: None Reported      Modes of Intervention: Education, Support, Socialization, and Problem-solving    Discipline Responsible: Psychoeducational Specialist      Signature:  MAGDALENE BAEZ   Current every day smoker

## 2025-01-06 NOTE — ED PROVIDER NOTE - TIMING
COLLIN ELLISON received referral to contact patient to provide support regarding dental and vision insurance.  Chart reviewed and call to patient and left message.  Will await return call and remain available to provide support.   
gradual onset
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
## 2025-03-17 NOTE — ED ADULT NURSE NOTE - NSSEPSISSUSPECTED_ED_A_ED
[facility]  Cardiac Electrophysiology  [address]  [place] [address]   Office: [phone]  Fax: [phone]       Cardiac Electrophysiology - Post Ablation Discharge Instructions      PROCEDURE   Atrial fibrillation ablation         MEDICATION INSTRUCTIONS   Continue taking your prior to procedure medications, including sotalol for now - we will assess for cessation in follow-up.  Continue taking your blood thinner rivaroxaban (Xarelto).          DISCHARGE INSTRUCTIONS   Medications   Take your medications as prescribed.   It is important for you to continue your blood thinner without interruption, unless your electrophysiologist instructs you otherwise.     General instructions   Have an adult stay with you until tomorrow.   You may resume your normal diet.     You may shower tomorrow.  Do not take a bath, or use a hot tub or pool for at least 1 week.    Activity recommendations   Do not drive for 3 days.   Avoid stooping or squatting more than 90 degrees at the hips for 7 days.   Avoid repetitive motions such as loading , vacuuming, raking or shoveling for 7 days.   Avoid heavy lifting (greater than 25lbs) for 7 days.       Groin care instructions   For the first 24 hours after your ablation, check the groin access sites every 1-2 hours while awake.   You may keep a bandaid over the puncture sites for 1 or 2 days post-procedure and thereafter may keep these sites uncovered.  Change the bandaid daily.  If there is minor oozing, apply another bandaid and remove it after 12 hours.   For 2 days, when you cough, sneeze, laugh or move your bowels, hold your hand over the puncture sites and press firmly.   Do not scrub the groin access sites.   Do not use lotion or powder near the groin access sites.       Arrhythmias following ablation  Recurrent atrial fibrillation and palpitations are common within the first 3 months post ablation while your heart recovers from  the procedure.  These are usually more frequent in the first few weeks following ablation and should occur less frequently over time.  Please contact us if you are having frequent or long lasting atrial fibrillation episodes following ablation.    Common findings after ablation  Bruising and a dime or pea size lump at the access sites is common.  If you notice increased swelling, external bleeding, or have other concerns regarding your groin access sites please call your electrophysiology team's office, and if after hours consider emergency department evaluation.   Soreness and mild pain at your groin sites is normal, to help relieve this pain you can apply ice/cold packs to the sites for 20min 3-4 times per day.   Pleuritic chest discomfort (chest pain worse with taking deep breaths, worse with laying flat on your back) can occur after ablation, usually coming about within the first 24-72hrs post ablation.  If this occurs and is severe enough to be troublesome to you, please call us and consider starting a course of ibuprofen 400mg three times daily for 5 to 7 days.     Things to watch for   As with any type of procedure, please be more attentive to unusual symptoms post ablation (eg. fever, neurologic changes, pain with swallowing, loss of consciousness, etc) - we recommend ER evaluation for any such symptoms in the first few weeks post procedure.       Contact the EP Nurse line with any of the following.  Contact the cardiology on-call number after business hours.    Consider ER evaluation for severe symptoms.   Groin pain, swelling, or growing hard lump around the puncture sites.   Groin redness, tenderness, swelling, or drainage (blood or pus).   Neurological changes (for example: leg, arm or face weakness or numbness, difficulties with speech or word finding, problems walking or with your balance, vision changes).   Any numbness, coolness or changes in color in your extremities.  Sudden onset severe abdominal  or back pain.  Moderate or severe chest pain not relieved by Tylenol or Ibuprofen, particularly after the first 48 hours.   Shortness of breath.   Chills or fever greater than 100 F.   Difficulty swallowing food or liquids.  Coughing up blood.   Blood in your stool.  Nausea and vomiting.  Difficulty urinating.  Recurrent atrial fibrillation associated with prolonged rapid heart rates (for example, heart rates over 140bpm for greater than 4 hours) or associated with additional concerning symptoms (for example, chest pain, lightheadedness, loss of consciousness, sweating).     If you are being evaluated at an emergency department, please tell your ER doctor that you have recently underwent an atrial fibrillation ablation and ask the ER to contact our office.  Many special considerations apply following ablation - these may be overlooked during an ER evaluation.      Our office will have a follow-up visit scheduled for you in approximately 6 weeks.  Please do not hesitate to call us before that time should issues arise.         [facility]      To reach the EP nurses working with Dr. Seals:   [phone]        To reach the general [facility] line or after hours service:   [phone]   If you are calling after hours, please listen to the entire voicemail,    a live  will answer at the end of the message.             Yes

## 2025-04-06 NOTE — ED ADULT TRIAGE NOTE - PRO INTERPRETER NEED 2
Problem: Adult Inpatient Plan of Care  Goal: Plan of Care Review  Outcome: Progressing  Goal: Patient-Specific Goal (Individualized)  Outcome: Progressing  Goal: Absence of Hospital-Acquired Illness or Injury  Outcome: Progressing  Goal: Optimal Comfort and Wellbeing  Outcome: Progressing  Goal: Readiness for Transition of Care  Outcome: Progressing     Problem: Infection  Goal: Absence of Infection Signs and Symptoms  Outcome: Progressing     Problem: Wound  Goal: Optimal Coping  Outcome: Progressing  Goal: Optimal Functional Ability  Outcome: Progressing  Goal: Absence of Infection Signs and Symptoms  Outcome: Progressing  Goal: Improved Oral Intake  Outcome: Progressing  Goal: Optimal Pain Control and Function  Outcome: Progressing  Goal: Skin Health and Integrity  Outcome: Progressing  Goal: Optimal Wound Healing  Outcome: Progressing     Problem: Fall Injury Risk  Goal: Absence of Fall and Fall-Related Injury  Outcome: Progressing      English